# Patient Record
Sex: FEMALE | Race: WHITE | NOT HISPANIC OR LATINO | Employment: OTHER | RURAL
[De-identification: names, ages, dates, MRNs, and addresses within clinical notes are randomized per-mention and may not be internally consistent; named-entity substitution may affect disease eponyms.]

---

## 2020-07-07 ENCOUNTER — HISTORICAL (OUTPATIENT)
Dept: ADMINISTRATIVE | Facility: HOSPITAL | Age: 65
End: 2020-07-07

## 2020-07-28 ENCOUNTER — HISTORICAL (OUTPATIENT)
Dept: ADMINISTRATIVE | Facility: HOSPITAL | Age: 65
End: 2020-07-28

## 2020-07-30 LAB
LAB AP COMMENTS: NORMAL
LAB AP GENERAL CAT - HISTORICAL: NORMAL
LAB AP INTERPRETATION/RESULT - HISTORICAL: NEGATIVE
LAB AP SPECIMEN ADEQUACY - HISTORICAL: NORMAL
LAB AP SPECIMEN SUBMITTED - HISTORICAL: NORMAL
PAP RECOMMENDATION EXT: NORMAL
PAP SMEAR: NORMAL

## 2021-04-08 ENCOUNTER — HISTORICAL (OUTPATIENT)
Dept: ADMINISTRATIVE | Facility: HOSPITAL | Age: 66
End: 2021-04-08

## 2021-04-19 ENCOUNTER — HISTORICAL (OUTPATIENT)
Dept: ADMINISTRATIVE | Facility: HOSPITAL | Age: 66
End: 2021-04-19

## 2021-04-21 DIAGNOSIS — R41.3 MEMORY IMPAIRMENT: Primary | ICD-10-CM

## 2021-05-18 DIAGNOSIS — E78.5 HYPERLIPIDEMIA, UNSPECIFIED HYPERLIPIDEMIA TYPE: ICD-10-CM

## 2021-05-18 DIAGNOSIS — E78.5 HYPERLIPIDEMIA, UNSPECIFIED HYPERLIPIDEMIA TYPE: Primary | ICD-10-CM

## 2021-05-18 RX ORDER — PRAVASTATIN SODIUM 40 MG/1
40 TABLET ORAL NIGHTLY
COMMUNITY
End: 2021-05-18 | Stop reason: SDUPTHER

## 2021-05-18 RX ORDER — PRAVASTATIN SODIUM 40 MG/1
40 TABLET ORAL NIGHTLY
Qty: 30 TABLET | Refills: 5 | Status: SHIPPED | OUTPATIENT
Start: 2021-05-18 | End: 2021-05-18 | Stop reason: SDUPTHER

## 2021-05-18 RX ORDER — PRAVASTATIN SODIUM 40 MG/1
40 TABLET ORAL NIGHTLY
Qty: 30 TABLET | Refills: 5 | Status: SHIPPED | OUTPATIENT
Start: 2021-05-18 | End: 2021-07-29 | Stop reason: SDUPTHER

## 2021-05-24 RX ORDER — OMEGA-3 FATTY ACIDS 1000 MG
1 CAPSULE ORAL DAILY
COMMUNITY
End: 2021-05-25

## 2021-05-24 RX ORDER — GLUCOSAMINE SULFATE DIPOT CHLR 1000 MG
1 TABLET ORAL DAILY
COMMUNITY

## 2021-05-24 RX ORDER — MELOXICAM 7.5 MG/1
7.5 TABLET ORAL DAILY
COMMUNITY
End: 2021-07-29 | Stop reason: SDUPTHER

## 2021-05-24 RX ORDER — LANOLIN ALCOHOL/MO/W.PET/CERES
100 CREAM (GRAM) TOPICAL DAILY
COMMUNITY
End: 2022-01-25 | Stop reason: ALTCHOICE

## 2021-05-24 RX ORDER — PAROXETINE 30 MG/1
30 TABLET, FILM COATED ORAL EVERY MORNING
COMMUNITY
End: 2021-07-29 | Stop reason: SDUPTHER

## 2021-05-24 RX ORDER — LISINOPRIL AND HYDROCHLOROTHIAZIDE 12.5; 2 MG/1; MG/1
1 TABLET ORAL DAILY
COMMUNITY
End: 2021-07-29 | Stop reason: SDUPTHER

## 2021-05-24 RX ORDER — OMEPRAZOLE 20 MG/1
20 CAPSULE, DELAYED RELEASE ORAL DAILY
COMMUNITY
End: 2021-07-29 | Stop reason: SDUPTHER

## 2021-05-25 ENCOUNTER — OFFICE VISIT (OUTPATIENT)
Dept: FAMILY MEDICINE | Facility: CLINIC | Age: 66
End: 2021-05-25
Payer: COMMERCIAL

## 2021-05-25 VITALS
RESPIRATION RATE: 16 BRPM | DIASTOLIC BLOOD PRESSURE: 62 MMHG | BODY MASS INDEX: 27.48 KG/M2 | TEMPERATURE: 98 F | WEIGHT: 171 LBS | HEART RATE: 70 BPM | HEIGHT: 66 IN | OXYGEN SATURATION: 93 % | SYSTOLIC BLOOD PRESSURE: 100 MMHG

## 2021-05-25 DIAGNOSIS — M19.90 ARTHRITIS: ICD-10-CM

## 2021-05-25 DIAGNOSIS — M54.31 BACK PAIN WITH RIGHT-SIDED SCIATICA: Primary | ICD-10-CM

## 2021-05-25 PROCEDURE — 99213 PR OFFICE/OUTPT VISIT, EST, LEVL III, 20-29 MIN: ICD-10-PCS | Mod: ,,, | Performed by: INTERNAL MEDICINE

## 2021-05-25 PROCEDURE — 99213 OFFICE O/P EST LOW 20 MIN: CPT | Mod: ,,, | Performed by: INTERNAL MEDICINE

## 2021-05-25 RX ORDER — METHYLPREDNISOLONE 4 MG/1
TABLET ORAL
Qty: 1 PACKAGE | Refills: 0 | Status: SHIPPED | OUTPATIENT
Start: 2021-05-25 | End: 2022-01-25 | Stop reason: ALTCHOICE

## 2021-07-29 ENCOUNTER — OFFICE VISIT (OUTPATIENT)
Dept: FAMILY MEDICINE | Facility: CLINIC | Age: 66
End: 2021-07-29
Payer: COMMERCIAL

## 2021-07-29 VITALS
DIASTOLIC BLOOD PRESSURE: 80 MMHG | WEIGHT: 173 LBS | SYSTOLIC BLOOD PRESSURE: 118 MMHG | TEMPERATURE: 99 F | BODY MASS INDEX: 27.8 KG/M2 | RESPIRATION RATE: 18 BRPM | HEIGHT: 66 IN | OXYGEN SATURATION: 97 % | HEART RATE: 68 BPM

## 2021-07-29 DIAGNOSIS — Z13.1 SCREENING FOR DIABETES MELLITUS: ICD-10-CM

## 2021-07-29 DIAGNOSIS — I10 HYPERTENSION, UNSPECIFIED TYPE: ICD-10-CM

## 2021-07-29 DIAGNOSIS — M19.90 ARTHRITIS: ICD-10-CM

## 2021-07-29 DIAGNOSIS — Z13.220 SCREENING, LIPID: ICD-10-CM

## 2021-07-29 DIAGNOSIS — E78.5 HYPERLIPIDEMIA, UNSPECIFIED HYPERLIPIDEMIA TYPE: ICD-10-CM

## 2021-07-29 DIAGNOSIS — Z00.00 VISIT FOR PREVENTIVE HEALTH EXAMINATION: Primary | ICD-10-CM

## 2021-07-29 DIAGNOSIS — K21.9 GASTROESOPHAGEAL REFLUX DISEASE, UNSPECIFIED WHETHER ESOPHAGITIS PRESENT: ICD-10-CM

## 2021-07-29 DIAGNOSIS — F32.A DEPRESSION, UNSPECIFIED DEPRESSION TYPE: ICD-10-CM

## 2021-07-29 LAB
ALBUMIN SERPL BCP-MCNC: 3.7 G/DL (ref 3.5–5)
ALBUMIN/GLOB SERPL: 1.1 {RATIO}
ALP SERPL-CCNC: 85 U/L (ref 50–130)
ALT SERPL W P-5'-P-CCNC: 13 U/L (ref 13–56)
ANION GAP SERPL CALCULATED.3IONS-SCNC: 8 MMOL/L (ref 7–16)
AST SERPL W P-5'-P-CCNC: 24 U/L (ref 15–37)
BASOPHILS # BLD AUTO: 0.06 K/UL (ref 0–0.2)
BASOPHILS NFR BLD AUTO: 0.9 % (ref 0–1)
BILIRUB SERPL-MCNC: 0.3 MG/DL (ref 0–1.2)
BUN SERPL-MCNC: 17 MG/DL (ref 7–18)
BUN/CREAT SERPL: 30 (ref 6–20)
CALCIUM SERPL-MCNC: 9.5 MG/DL (ref 8.5–10.1)
CHLORIDE SERPL-SCNC: 107 MMOL/L (ref 98–107)
CHOLEST SERPL-MCNC: 200 MG/DL (ref 0–200)
CHOLEST/HDLC SERPL: 5 {RATIO}
CK SERPL-CCNC: 97 U/L (ref 26–192)
CO2 SERPL-SCNC: 30 MMOL/L (ref 21–32)
CREAT SERPL-MCNC: 0.57 MG/DL (ref 0.55–1.02)
DIFFERENTIAL METHOD BLD: ABNORMAL
EOSINOPHIL # BLD AUTO: 0.13 K/UL (ref 0–0.5)
EOSINOPHIL NFR BLD AUTO: 1.9 % (ref 1–4)
ERYTHROCYTE [DISTWIDTH] IN BLOOD BY AUTOMATED COUNT: 12.5 % (ref 11.5–14.5)
GLOBULIN SER-MCNC: 3.5 G/DL (ref 2–4)
GLUCOSE SERPL-MCNC: 100 MG/DL (ref 74–106)
HCT VFR BLD AUTO: 40.4 % (ref 38–47)
HDLC SERPL-MCNC: 40 MG/DL (ref 40–60)
HGB BLD-MCNC: 13.3 G/DL (ref 12–16)
IMM GRANULOCYTES # BLD AUTO: 0.01 K/UL (ref 0–0.04)
IMM GRANULOCYTES NFR BLD: 0.1 % (ref 0–0.4)
LDLC SERPL CALC-MCNC: 138 MG/DL
LDLC/HDLC SERPL: 3.5 {RATIO}
LYMPHOCYTES # BLD AUTO: 2.03 K/UL (ref 1–4.8)
LYMPHOCYTES NFR BLD AUTO: 30.3 % (ref 27–41)
MCH RBC QN AUTO: 30.2 PG (ref 27–31)
MCHC RBC AUTO-ENTMCNC: 32.9 G/DL (ref 32–36)
MCV RBC AUTO: 91.8 FL (ref 80–96)
MONOCYTES # BLD AUTO: 0.62 K/UL (ref 0–0.8)
MONOCYTES NFR BLD AUTO: 9.2 % (ref 2–6)
MPC BLD CALC-MCNC: 9.7 FL (ref 9.4–12.4)
NEUTROPHILS # BLD AUTO: 3.86 K/UL (ref 1.8–7.7)
NEUTROPHILS NFR BLD AUTO: 57.6 % (ref 53–65)
NONHDLC SERPL-MCNC: 160 MG/DL
NRBC # BLD AUTO: 0 X10E3/UL
NRBC, AUTO (.00): 0 %
PLATELET # BLD AUTO: 310 K/UL (ref 150–400)
POTASSIUM SERPL-SCNC: 4 MMOL/L (ref 3.5–5.1)
PROT SERPL-MCNC: 7.2 G/DL (ref 6.4–8.2)
RBC # BLD AUTO: 4.4 M/UL (ref 4.2–5.4)
SODIUM SERPL-SCNC: 141 MMOL/L (ref 136–145)
TRIGL SERPL-MCNC: 109 MG/DL (ref 35–150)
VLDLC SERPL-MCNC: 22 MG/DL
WBC # BLD AUTO: 6.71 K/UL (ref 4.5–11)

## 2021-07-29 PROCEDURE — 80061 LIPID PANEL: CPT | Mod: ,,, | Performed by: CLINICAL MEDICAL LABORATORY

## 2021-07-29 PROCEDURE — 82550 CK: ICD-10-PCS | Mod: ,,, | Performed by: CLINICAL MEDICAL LABORATORY

## 2021-07-29 PROCEDURE — 80061 LIPID PANEL: ICD-10-PCS | Mod: ,,, | Performed by: CLINICAL MEDICAL LABORATORY

## 2021-07-29 PROCEDURE — 85025 COMPLETE CBC W/AUTO DIFF WBC: CPT | Mod: ,,, | Performed by: CLINICAL MEDICAL LABORATORY

## 2021-07-29 PROCEDURE — 99396 PR PREVENTIVE VISIT,EST,40-64: ICD-10-PCS | Mod: ,,, | Performed by: NURSE PRACTITIONER

## 2021-07-29 PROCEDURE — 80053 COMPREHENSIVE METABOLIC PANEL: ICD-10-PCS | Mod: ,,, | Performed by: CLINICAL MEDICAL LABORATORY

## 2021-07-29 PROCEDURE — 80053 COMPREHEN METABOLIC PANEL: CPT | Mod: ,,, | Performed by: CLINICAL MEDICAL LABORATORY

## 2021-07-29 PROCEDURE — 82550 ASSAY OF CK (CPK): CPT | Mod: ,,, | Performed by: CLINICAL MEDICAL LABORATORY

## 2021-07-29 PROCEDURE — 85025 CBC WITH DIFFERENTIAL: ICD-10-PCS | Mod: ,,, | Performed by: CLINICAL MEDICAL LABORATORY

## 2021-07-29 PROCEDURE — 99396 PREV VISIT EST AGE 40-64: CPT | Mod: ,,, | Performed by: NURSE PRACTITIONER

## 2021-07-29 RX ORDER — PRAVASTATIN SODIUM 40 MG/1
40 TABLET ORAL NIGHTLY
Qty: 30 TABLET | Refills: 5 | Status: SHIPPED | OUTPATIENT
Start: 2021-07-29 | End: 2022-04-28 | Stop reason: SDUPTHER

## 2021-07-29 RX ORDER — MELOXICAM 7.5 MG/1
7.5 TABLET ORAL DAILY
Qty: 90 TABLET | Refills: 1 | Status: SHIPPED | OUTPATIENT
Start: 2021-07-29 | End: 2022-04-28 | Stop reason: SDUPTHER

## 2021-07-29 RX ORDER — PAROXETINE 30 MG/1
30 TABLET, FILM COATED ORAL EVERY MORNING
Qty: 90 TABLET | Refills: 1 | Status: SHIPPED | OUTPATIENT
Start: 2021-07-29 | End: 2022-04-28 | Stop reason: SDUPTHER

## 2021-07-29 RX ORDER — OMEPRAZOLE 20 MG/1
20 CAPSULE, DELAYED RELEASE ORAL DAILY
Qty: 90 CAPSULE | Refills: 1 | Status: SHIPPED | OUTPATIENT
Start: 2021-07-29 | End: 2022-01-25 | Stop reason: ALTCHOICE

## 2021-07-29 RX ORDER — LISINOPRIL AND HYDROCHLOROTHIAZIDE 12.5; 2 MG/1; MG/1
1 TABLET ORAL DAILY
Qty: 90 TABLET | Refills: 1 | Status: SHIPPED | OUTPATIENT
Start: 2021-07-29 | End: 2022-04-28 | Stop reason: SDUPTHER

## 2021-10-08 ENCOUNTER — CLINICAL SUPPORT (OUTPATIENT)
Dept: FAMILY MEDICINE | Facility: CLINIC | Age: 66
End: 2021-10-08
Payer: COMMERCIAL

## 2021-10-08 DIAGNOSIS — Z23 ENCOUNTER FOR IMMUNIZATION: Primary | ICD-10-CM

## 2021-10-08 PROCEDURE — 90686 FLU VACCINE (QUAD) GREATER THAN OR EQUAL TO 3YO PRESERVATIVE FREE IM: ICD-10-PCS | Mod: ,,, | Performed by: NURSE PRACTITIONER

## 2021-10-08 PROCEDURE — 90471 IMMUNIZATION ADMIN: CPT | Mod: ,,, | Performed by: NURSE PRACTITIONER

## 2021-10-08 PROCEDURE — 90471 FLU VACCINE (QUAD) GREATER THAN OR EQUAL TO 3YO PRESERVATIVE FREE IM: ICD-10-PCS | Mod: ,,, | Performed by: NURSE PRACTITIONER

## 2021-10-08 PROCEDURE — 90686 IIV4 VACC NO PRSV 0.5 ML IM: CPT | Mod: ,,, | Performed by: NURSE PRACTITIONER

## 2021-10-22 ENCOUNTER — OFFICE VISIT (OUTPATIENT)
Dept: FAMILY MEDICINE | Facility: CLINIC | Age: 66
End: 2021-10-22
Payer: COMMERCIAL

## 2021-10-22 VITALS
HEIGHT: 66 IN | WEIGHT: 184 LBS | BODY MASS INDEX: 29.57 KG/M2 | OXYGEN SATURATION: 96 % | TEMPERATURE: 98 F | SYSTOLIC BLOOD PRESSURE: 120 MMHG | HEART RATE: 74 BPM | DIASTOLIC BLOOD PRESSURE: 80 MMHG | RESPIRATION RATE: 16 BRPM
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
## 2021-10-22 DIAGNOSIS — M79.601 PAIN OF RIGHT UPPER EXTREMITY: ICD-10-CM

## 2021-10-22 PROCEDURE — 99213 OFFICE O/P EST LOW 20 MIN: CPT | Mod: ,,, | Performed by: FAMILY MEDICINE

## 2021-10-22 PROCEDURE — 99213 PR OFFICE/OUTPT VISIT, EST, LEVL III, 20-29 MIN: ICD-10-PCS | Mod: ,,, | Performed by: FAMILY MEDICINE

## 2021-10-22 RX ORDER — ACETAMINOPHEN 500 MG
1000 TABLET ORAL EVERY 6 HOURS PRN
Qty: 60 TABLET | Refills: 1 | Status: SHIPPED | OUTPATIENT
Start: 2021-10-22 | End: 2021-11-01

## 2021-11-01 PROBLEM — Z13.1 SCREENING FOR DIABETES MELLITUS: Status: RESOLVED | Noted: 2021-07-29 | Resolved: 2021-11-01

## 2021-11-01 PROBLEM — Z13.220 SCREENING, LIPID: Status: RESOLVED | Noted: 2021-07-29 | Resolved: 2021-11-01

## 2021-11-01 PROBLEM — Z00.00 VISIT FOR PREVENTIVE HEALTH EXAMINATION: Status: RESOLVED | Noted: 2021-07-29 | Resolved: 2021-11-01

## 2021-12-28 ENCOUNTER — IMMUNIZATION (OUTPATIENT)
Dept: FAMILY MEDICINE | Facility: CLINIC | Age: 66
End: 2021-12-28
Payer: COMMERCIAL

## 2021-12-28 DIAGNOSIS — Z23 NEED FOR VACCINATION: Primary | ICD-10-CM

## 2021-12-28 PROCEDURE — 0064A COVID-19, MRNA, LNP-S, PF, 100 MCG/0.25 ML DOSE VACCINE (MODERNA BOOSTER): CPT | Mod: ,,, | Performed by: NURSE PRACTITIONER

## 2021-12-28 PROCEDURE — 91306 COVID-19, MRNA, LNP-S, PF, 100 MCG/0.25 ML DOSE VACCINE (MODERNA BOOSTER): ICD-10-PCS | Mod: ,,, | Performed by: NURSE PRACTITIONER

## 2021-12-28 PROCEDURE — 91306 COVID-19, MRNA, LNP-S, PF, 100 MCG/0.25 ML DOSE VACCINE (MODERNA BOOSTER): CPT | Mod: ,,, | Performed by: NURSE PRACTITIONER

## 2021-12-28 PROCEDURE — 0064A COVID-19, MRNA, LNP-S, PF, 100 MCG/0.25 ML DOSE VACCINE (MODERNA BOOSTER): ICD-10-PCS | Mod: ,,, | Performed by: NURSE PRACTITIONER

## 2022-01-25 ENCOUNTER — OFFICE VISIT (OUTPATIENT)
Dept: FAMILY MEDICINE | Facility: CLINIC | Age: 67
End: 2022-01-25
Payer: COMMERCIAL

## 2022-01-25 VITALS
WEIGHT: 181 LBS | OXYGEN SATURATION: 98 % | RESPIRATION RATE: 18 BRPM | TEMPERATURE: 98 F | DIASTOLIC BLOOD PRESSURE: 80 MMHG | BODY MASS INDEX: 29.09 KG/M2 | HEART RATE: 74 BPM | SYSTOLIC BLOOD PRESSURE: 130 MMHG | HEIGHT: 66 IN

## 2022-01-25 DIAGNOSIS — M17.11 OSTEOARTHRITIS OF RIGHT KNEE, UNSPECIFIED OSTEOARTHRITIS TYPE: Primary | ICD-10-CM

## 2022-01-25 PROCEDURE — 3008F PR BODY MASS INDEX (BMI) DOCUMENTED: ICD-10-PCS | Mod: CPTII,,, | Performed by: NURSE PRACTITIONER

## 2022-01-25 PROCEDURE — 1159F PR MEDICATION LIST DOCUMENTED IN MEDICAL RECORD: ICD-10-PCS | Mod: CPTII,,, | Performed by: NURSE PRACTITIONER

## 2022-01-25 PROCEDURE — 3075F SYST BP GE 130 - 139MM HG: CPT | Mod: CPTII,,, | Performed by: NURSE PRACTITIONER

## 2022-01-25 PROCEDURE — 3079F PR MOST RECENT DIASTOLIC BLOOD PRESSURE 80-89 MM HG: ICD-10-PCS | Mod: CPTII,,, | Performed by: NURSE PRACTITIONER

## 2022-01-25 PROCEDURE — 1160F PR REVIEW ALL MEDS BY PRESCRIBER/CLIN PHARMACIST DOCUMENTED: ICD-10-PCS | Mod: CPTII,,, | Performed by: NURSE PRACTITIONER

## 2022-01-25 PROCEDURE — 99212 OFFICE O/P EST SF 10 MIN: CPT | Mod: ,,, | Performed by: NURSE PRACTITIONER

## 2022-01-25 PROCEDURE — 3008F BODY MASS INDEX DOCD: CPT | Mod: CPTII,,, | Performed by: NURSE PRACTITIONER

## 2022-01-25 PROCEDURE — 3075F PR MOST RECENT SYSTOLIC BLOOD PRESS GE 130-139MM HG: ICD-10-PCS | Mod: CPTII,,, | Performed by: NURSE PRACTITIONER

## 2022-01-25 PROCEDURE — 1159F MED LIST DOCD IN RCRD: CPT | Mod: CPTII,,, | Performed by: NURSE PRACTITIONER

## 2022-01-25 PROCEDURE — 1160F RVW MEDS BY RX/DR IN RCRD: CPT | Mod: CPTII,,, | Performed by: NURSE PRACTITIONER

## 2022-01-25 PROCEDURE — 3079F DIAST BP 80-89 MM HG: CPT | Mod: CPTII,,, | Performed by: NURSE PRACTITIONER

## 2022-01-25 PROCEDURE — 99212 PR OFFICE/OUTPT VISIT, EST, LEVL II, 10-19 MIN: ICD-10-PCS | Mod: ,,, | Performed by: NURSE PRACTITIONER

## 2022-01-25 NOTE — PATIENT INSTRUCTIONS
Discussed ice to reduce swelling consider ortho for injection especially when having increase in pain impacting mobility also trial of pennsaid

## 2022-01-25 NOTE — PROGRESS NOTES
2022     LIDIA Banks   [facility]  [address]  [place] [address]     PATIENT NAME: Lucita Bonilla  : 1955  DATE: 22  MRN: [id number]      Billing Provider: LIDIA Banks  Level of Service:   Patient PCP Information     Provider PCP Type    Zaki Damico DO General          Reason for Visit / Chief Complaint: Knee Pain (C/o right knee pain, swelling for a couple of days, difficulty walking at times)       Update PCP  Update Chief Complaint         History of Present Illness / Problem Focused Workflow     Lucita Bonilla presents to the clinic increasing right knee pain, she has slipped on it recently, notices swelling in medial knee not giving away but will swell with increased use and changes in weather      Review of Systems     Review of Systems   Constitutional: Negative for activity change, appetite change, fatigue and fever.   HENT: Negative for nasal congestion, ear pain and trouble swallowing.    Eyes: Negative for visual disturbance.   Respiratory: Negative for cough, chest tightness, shortness of breath and wheezing.    Cardiovascular: Negative for chest pain, palpitations, leg swelling and claudication.   Gastrointestinal: Negative for abdominal pain.   Genitourinary: Negative for difficulty urinating.   Musculoskeletal: Positive for arthralgias and joint swelling.   Integumentary:  Negative for pallor, rash and wound.   Neurological: Negative for weakness and headaches.   Hematological: Negative for adenopathy.        Medical / Social / Family History     Past Medical History:   Diagnosis Date    Adult BMI 25.0-25.9 kg/sq m     Anxiety     Arthritis     Calculus of gallbladder and bile duct w/o cholecystitis or obstruction     Carpal tunnel syndrome     Depression     GERD (gastroesophageal reflux disease)     History of traumatic brain injury     Hyperlipidemia     Hypertension     Memory impairment      "Minimal cognitive impairment     Rosacea     Scoliosis of lumbar spine     Vitamin D deficiency     Xerosis cutis        Past Surgical History:   Procedure Laterality Date    BRAIN SURGERY      COLONOSCOPY  01/20/2012    HYSTERECTOMY      TONSILLECTOMY         Social History  Ms.  reports that she has quit smoking. She has never used smokeless tobacco. She reports that she does not drink alcohol.    Family History  Ms.'s family history includes Heart disease in her father; Hypertension in her brother, father, and mother; Thyroid disease in her mother.    Medications and Allergies     Medications  Outpatient Medications Marked as Taking for the 1/25/22 encounter (Office Visit) with LIDIA Banks   Medication Sig Dispense Refill    calcium carb-D3-mag ox-zinc ox (ALEX MAG ZINC PLUS D3) 333 mg-133 unit -133 mg-5 mg Tab Take 1 tablet by mouth once daily.      lisinopriL-hydrochlorothiazide (PRINZIDE,ZESTORETIC) 20-12.5 mg per tablet Take 1 tablet by mouth once daily. 90 tablet 1    meloxicam (MOBIC) 7.5 MG tablet Take 1 tablet (7.5 mg total) by mouth once daily. 90 tablet 1    paroxetine (PAXIL) 30 MG tablet Take 1 tablet (30 mg total) by mouth every morning. 90 tablet 1    pravastatin (PRAVACHOL) 40 MG tablet Take 1 tablet (40 mg total) by mouth every evening. 30 tablet 5       Allergies  Review of patient's allergies indicates:   Allergen Reactions    Amoxicillin Itching       Physical Examination     Vitals:    01/25/22 0806   BP: 130/80   BP Location: Left arm   Patient Position: Sitting   Pulse: 74   Resp: 18   Temp: 98.3 °F (36.8 °C)   TempSrc: Oral   SpO2: 98%   Weight: 82.1 kg (181 lb)   Height: 5' 5.5" (1.664 m)      Physical Exam  Vitals and nursing note reviewed.   Constitutional:       Appearance: Normal appearance.   HENT:      Right Ear: External ear normal.      Left Ear: External ear normal.   Eyes:      Pupils: Pupils are equal, round, and reactive to light. "   Cardiovascular:      Rate and Rhythm: Regular rhythm.      Heart sounds: Normal heart sounds.   Pulmonary:      Breath sounds: Normal breath sounds.   Musculoskeletal:      Cervical back: Normal range of motion.      Right knee: Swelling, effusion and crepitus present. No MCL laxity.   Skin:     General: Skin is warm and dry.      Capillary Refill: Capillary refill takes less than 2 seconds.   Neurological:      Mental Status: She is alert.          Assessment and Plan (including Health Maintenance)      Problem List  Smart Sets  Document Outside HM   :    Plan:   Discussed ice to reduce swelling consider ortho for injection especially when having increase in pain impacting mobility also trial of pennsaid      Health Maintenance Due   Topic Date Due    Hepatitis C Screening  Never done    HIV Screening  Never done    Colorectal Cancer Screening  01/20/2022       Problem List Items Addressed This Visit    None     Visit Diagnoses     Osteoarthritis of right knee, unspecified osteoarthritis type    -  Primary        Osteoarthritis of right knee, unspecified osteoarthritis type       Health Maintenance Topics with due status: Not Due       Topic Last Completion Date    TETANUS VACCINE 11/09/2019    Mammogram 04/19/2021    Lipid Panel 07/29/2021       Procedures     Future Appointments   Date Time Provider Department Center   1/31/2022  9:00 AM Starr Dietz NP [facility]   8/2/2022  9:00 AM Starr Dietz NP [facility]        Follow up if symptoms worsen or fail to improve.     Signature:  LIDIA Banks    Date of encounter: 1/25/22

## 2022-02-21 ENCOUNTER — OFFICE VISIT (OUTPATIENT)
Dept: FAMILY MEDICINE | Facility: CLINIC | Age: 67
End: 2022-02-21
Payer: COMMERCIAL

## 2022-02-21 VITALS
HEART RATE: 85 BPM | OXYGEN SATURATION: 99 % | TEMPERATURE: 98 F | DIASTOLIC BLOOD PRESSURE: 86 MMHG | BODY MASS INDEX: 29.03 KG/M2 | RESPIRATION RATE: 20 BRPM | SYSTOLIC BLOOD PRESSURE: 110 MMHG | HEIGHT: 66 IN | WEIGHT: 180.63 LBS

## 2022-02-21 DIAGNOSIS — M54.50 LUMBAR BACK PAIN: Primary | ICD-10-CM

## 2022-02-21 PROCEDURE — 3079F PR MOST RECENT DIASTOLIC BLOOD PRESSURE 80-89 MM HG: ICD-10-PCS | Mod: CPTII,,, | Performed by: FAMILY MEDICINE

## 2022-02-21 PROCEDURE — 1159F PR MEDICATION LIST DOCUMENTED IN MEDICAL RECORD: ICD-10-PCS | Mod: CPTII,,, | Performed by: FAMILY MEDICINE

## 2022-02-21 PROCEDURE — 3074F PR MOST RECENT SYSTOLIC BLOOD PRESSURE < 130 MM HG: ICD-10-PCS | Mod: CPTII,,, | Performed by: FAMILY MEDICINE

## 2022-02-21 PROCEDURE — 3074F SYST BP LT 130 MM HG: CPT | Mod: CPTII,,, | Performed by: FAMILY MEDICINE

## 2022-02-21 PROCEDURE — 99212 OFFICE O/P EST SF 10 MIN: CPT | Mod: ,,, | Performed by: FAMILY MEDICINE

## 2022-02-21 PROCEDURE — 3079F DIAST BP 80-89 MM HG: CPT | Mod: CPTII,,, | Performed by: FAMILY MEDICINE

## 2022-02-21 PROCEDURE — 99212 PR OFFICE/OUTPT VISIT, EST, LEVL II, 10-19 MIN: ICD-10-PCS | Mod: ,,, | Performed by: FAMILY MEDICINE

## 2022-02-21 PROCEDURE — 3008F PR BODY MASS INDEX (BMI) DOCUMENTED: ICD-10-PCS | Mod: CPTII,,, | Performed by: FAMILY MEDICINE

## 2022-02-21 PROCEDURE — 1159F MED LIST DOCD IN RCRD: CPT | Mod: CPTII,,, | Performed by: FAMILY MEDICINE

## 2022-02-21 PROCEDURE — 3008F BODY MASS INDEX DOCD: CPT | Mod: CPTII,,, | Performed by: FAMILY MEDICINE

## 2022-02-21 RX ORDER — ACETAMINOPHEN 500 MG
1000 TABLET ORAL EVERY 8 HOURS PRN
Qty: 60 TABLET | Refills: 0 | Status: SHIPPED | OUTPATIENT
Start: 2022-02-21 | End: 2022-06-17

## 2022-02-21 NOTE — PROGRESS NOTES
Zaki Damico DO   [facility]  [address]  [place]     PATIENT NAME: Lucita Bonilla  : 1955  DATE: 22  MRN: [id number]      Billing Provider: Zaki Damico DO  Level of Service:   Patient PCP Information     Provider PCP Type    Zaki Damico DO General          Reason for Visit / Chief Complaint: Back Pain (Reports she fell 4.5 weeks ago and having lower back pain. )       Update PCP  Update Chief Complaint         History of Present Illness / Problem Focused Workflow     Lucita Bonilla presents to the clinic for back pain that began 4 weeks ago. Pt reports was walking dog and going up an embankment and slipped and fell forward twice. Has experience low back pain since. No other complaints. No otc medication.       Review of Systems     Review of Systems   Constitutional: Negative.    Eyes: Negative.    Respiratory: Negative.    Cardiovascular: Negative.    Gastrointestinal: Negative.    All other systems reviewed and are negative.       Medical / Social / Family History     Past Medical History:   Diagnosis Date    Adult BMI 25.0-25.9 kg/sq m     Anxiety     Arthritis     Calculus of gallbladder and bile duct w/o cholecystitis or obstruction     Carpal tunnel syndrome     Depression     GERD (gastroesophageal reflux disease)     History of traumatic brain injury     Hyperlipidemia     Hypertension     Memory impairment     Minimal cognitive impairment     Rosacea     Scoliosis of lumbar spine     Vitamin D deficiency     Xerosis cutis        Past Surgical History:   Procedure Laterality Date    BRAIN SURGERY      COLONOSCOPY  2012    HYSTERECTOMY      TONSILLECTOMY         Social History  Ms.  reports that she has quit smoking. She has never used smokeless tobacco. She reports that she does not drink alcohol and does not use drugs.    Family History  Ms.'s family history includes Heart disease in her father; Hypertension in her  "brother, father, and mother; Thyroid disease in her mother.    Medications and Allergies     Medications  Outpatient Medications Marked as Taking for the 2/21/22 encounter (Office Visit) with Zaki Damico, DO   Medication Sig Dispense Refill    calcium carb-D3-mag ox-zinc ox (ALEX MAG ZINC PLUS D3) 333 mg-133 unit -133 mg-5 mg Tab Take 1 tablet by mouth once daily.      lisinopriL-hydrochlorothiazide (PRINZIDE,ZESTORETIC) 20-12.5 mg per tablet Take 1 tablet by mouth once daily. 90 tablet 1    meloxicam (MOBIC) 7.5 MG tablet Take 1 tablet (7.5 mg total) by mouth once daily. 90 tablet 1    paroxetine (PAXIL) 30 MG tablet Take 1 tablet (30 mg total) by mouth every morning. 90 tablet 1    pravastatin (PRAVACHOL) 40 MG tablet Take 1 tablet (40 mg total) by mouth every evening. 30 tablet 5       Allergies  Review of patient's allergies indicates:   Allergen Reactions    Amoxicillin Itching       Physical Examination     Vitals:    02/21/22 1639   BP: 110/86   BP Location: Left arm   Patient Position: Sitting   BP Method: Large (Manual)   Pulse: 85   Resp: 20   Temp: 98.4 °F (36.9 °C)   TempSrc: Oral   SpO2: 99%   Weight: 81.9 kg (180 lb 9.6 oz)   Height: 5' 5.5" (1.664 m)      Physical Exam  Vitals and nursing note reviewed.   Cardiovascular:      Pulses: Normal pulses.      Heart sounds: Normal heart sounds.   Pulmonary:      Effort: Pulmonary effort is normal.      Breath sounds: Normal breath sounds.   Musculoskeletal:         General: Tenderness and signs of injury present. No swelling or deformity. Normal range of motion.      Comments: Tenderness over L 5   Neurological:      General: No focal deficit present.      Mental Status: She is oriented to person, place, and time. Mental status is at baseline.      Sensory: No sensory deficit.      Motor: No weakness.      Gait: Gait normal.   Psychiatric:         Mood and Affect: Mood normal.         Behavior: Behavior normal.         Thought Content: Thought " content normal.         Judgment: Judgment normal.          Assessment and Plan (including Health Maintenance)      Problem List  Smart Sets  Document Outside HM   :    Plan:   X ray L spine        Health Maintenance Due   Topic Date Due    Hepatitis C Screening  Never done    HIV Screening  Never done    Colorectal Cancer Screening  01/20/2022    Mammogram  04/19/2022       Problem List Items Addressed This Visit    None     Visit Diagnoses     Lumbar back pain    -  Primary    Relevant Orders    X-Ray Lumbar Spine AP And Lateral        Lumbar back pain  -     X-Ray Lumbar Spine AP And Lateral; Future; Expected date: 02/21/2022       Health Maintenance Topics with due status: Not Due       Topic Last Completion Date    TETANUS VACCINE 11/09/2019    Lipid Panel 07/29/2021       Procedures     Future Appointments   Date Time Provider Department Center   8/2/2022  9:00 AM Starr Dietz NP [facility]        No follow-ups on file.       Signature:  Zaki Damico DO    Date of encounter: 2/21/22    Education Documentation  No documentation found.  Education Comments  No comments found.       There are no Patient Instructions on file for this visit.

## 2022-02-22 DIAGNOSIS — M54.9 DORSALGIA, UNSPECIFIED: ICD-10-CM

## 2022-03-01 ENCOUNTER — OFFICE VISIT (OUTPATIENT)
Dept: FAMILY MEDICINE | Facility: CLINIC | Age: 67
End: 2022-03-01
Payer: COMMERCIAL

## 2022-03-01 VITALS
DIASTOLIC BLOOD PRESSURE: 70 MMHG | SYSTOLIC BLOOD PRESSURE: 130 MMHG | WEIGHT: 181 LBS | RESPIRATION RATE: 18 BRPM | HEIGHT: 66 IN | HEART RATE: 77 BPM | TEMPERATURE: 99 F | OXYGEN SATURATION: 99 % | BODY MASS INDEX: 29.09 KG/M2

## 2022-03-01 DIAGNOSIS — M25.561 ACUTE PAIN OF RIGHT KNEE: Primary | ICD-10-CM
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
## 2022-03-01 DIAGNOSIS — M54.50 LUMBAR BACK PAIN: ICD-10-CM

## 2022-03-01 PROCEDURE — 3078F PR MOST RECENT DIASTOLIC BLOOD PRESSURE < 80 MM HG: ICD-10-PCS | Mod: CPTII,,, | Performed by: NURSE PRACTITIONER

## 2022-03-01 PROCEDURE — 3075F SYST BP GE 130 - 139MM HG: CPT | Mod: CPTII,,, | Performed by: NURSE PRACTITIONER

## 2022-03-01 PROCEDURE — 3008F BODY MASS INDEX DOCD: CPT | Mod: CPTII,,, | Performed by: NURSE PRACTITIONER

## 2022-03-01 PROCEDURE — 3078F DIAST BP <80 MM HG: CPT | Mod: CPTII,,, | Performed by: NURSE PRACTITIONER

## 2022-03-01 PROCEDURE — 3075F PR MOST RECENT SYSTOLIC BLOOD PRESS GE 130-139MM HG: ICD-10-PCS | Mod: CPTII,,, | Performed by: NURSE PRACTITIONER

## 2022-03-01 PROCEDURE — 99214 OFFICE O/P EST MOD 30 MIN: CPT | Mod: ,,, | Performed by: NURSE PRACTITIONER

## 2022-03-01 PROCEDURE — 1159F PR MEDICATION LIST DOCUMENTED IN MEDICAL RECORD: ICD-10-PCS | Mod: CPTII,,, | Performed by: NURSE PRACTITIONER

## 2022-03-01 PROCEDURE — 99214 PR OFFICE/OUTPT VISIT, EST, LEVL IV, 30-39 MIN: ICD-10-PCS | Mod: ,,, | Performed by: NURSE PRACTITIONER

## 2022-03-01 PROCEDURE — 3008F PR BODY MASS INDEX (BMI) DOCUMENTED: ICD-10-PCS | Mod: CPTII,,, | Performed by: NURSE PRACTITIONER

## 2022-03-01 PROCEDURE — 1159F MED LIST DOCD IN RCRD: CPT | Mod: CPTII,,, | Performed by: NURSE PRACTITIONER

## 2022-03-01 RX ORDER — DICLOFENAC SODIUM 75 MG/1
75 TABLET, DELAYED RELEASE ORAL 2 TIMES DAILY
Qty: 20 TABLET | Refills: 0 | Status: SHIPPED | OUTPATIENT
Start: 2022-03-01 | End: 2022-04-28 | Stop reason: ALTCHOICE

## 2022-03-01 RX ORDER — DICLOFENAC SODIUM 20 MG/G
1 SOLUTION TOPICAL EVERY 6 HOURS PRN
Qty: 112 G | Refills: 1 | Status: SHIPPED | OUTPATIENT
Start: 2022-03-01 | End: 2022-06-17

## 2022-03-01 NOTE — PROGRESS NOTES
Starr Dietz NP   [facility]  [address]  [place]     PATIENT NAME: Lucita Bonilla  : 1955  DATE: 3/1/22  MRN: [id number]      Billing Provider: Starr Dietz NP  Level of Service:   Patient PCP Information     Provider PCP Type    Zaki Damico DO General          Reason for Visit / Chief Complaint: Fall (Fell a month ago on wet grass. ), Knee Pain (Right knee is giving her trouble from her fall. Pt states that her knee is better when she is up and going. ), and Back Pain (Back pain from fall)       Update PCP  Update Chief Complaint         History of Present Illness / Problem Focused Workflow     Lucita Bonilla presents to the clinic   Fell a month ago on wet grass, right knee pain, stated that her knee is bettter when she is up and going, also back pain from fall.pain is worse in the am    Review of Systems     Review of Systems   Constitutional: Negative for chills, fatigue and fever.   HENT: Negative for nasal congestion, ear pain, facial swelling, hearing loss, mouth dryness, mouth sores, postnasal drip, rhinorrhea, sinus pressure/congestion and goiter.    Eyes: Negative for discharge and itching.   Respiratory: Negative for cough, shortness of breath and wheezing.    Cardiovascular: Negative for chest pain and leg swelling.   Gastrointestinal: Negative for abdominal pain, change in bowel habit and change in bowel habit.   Genitourinary: Negative for difficulty urinating, dysuria, enuresis, frequency, hematuria and urgency.   Musculoskeletal: Positive for back pain.        Right knee pain   Neurological: Negative for dizziness, vertigo, syncope, weakness and headaches.   Psychiatric/Behavioral: Negative for decreased concentration.        Medical / Social / Family History     Past Medical History:   Diagnosis Date    Adult BMI 25.0-25.9 kg/sq m     Anxiety     Arthritis     Calculus of gallbladder and bile duct w/o cholecystitis or obstruction     Carpal tunnel  syndrome     Depression     GERD (gastroesophageal reflux disease)     History of traumatic brain injury     Hyperlipidemia     Hypertension     Memory impairment     Minimal cognitive impairment     Rosacea     Scoliosis of lumbar spine     Vitamin D deficiency     Xerosis cutis        Past Surgical History:   Procedure Laterality Date    BRAIN SURGERY      COLONOSCOPY  01/20/2012    HYSTERECTOMY      TONSILLECTOMY         Social History  Ms.  reports that she has quit smoking. She has never used smokeless tobacco. She reports that she does not drink alcohol and does not use drugs.    Family History  Ms.'s family history includes Heart disease in her father; Hypertension in her brother, father, and mother; Thyroid disease in her mother.    Medications and Allergies     Medications  Outpatient Medications Marked as Taking for the 3/1/22 encounter (Office Visit) with Starr Dietz NP   Medication Sig Dispense Refill    acetaminophen (TYLENOL) 500 MG tablet Take 2 tablets (1,000 mg total) by mouth every 8 (eight) hours as needed for Pain. 60 tablet 0    calcium carb-D3-mag ox-zinc ox (ALEX MAG ZINC PLUS D3) 333 mg-133 unit -133 mg-5 mg Tab Take 1 tablet by mouth once daily.      lisinopriL-hydrochlorothiazide (PRINZIDE,ZESTORETIC) 20-12.5 mg per tablet Take 1 tablet by mouth once daily. 90 tablet 1    meloxicam (MOBIC) 7.5 MG tablet Take 1 tablet (7.5 mg total) by mouth once daily. 90 tablet 1    paroxetine (PAXIL) 30 MG tablet Take 1 tablet (30 mg total) by mouth every morning. 90 tablet 1    pravastatin (PRAVACHOL) 40 MG tablet Take 1 tablet (40 mg total) by mouth every evening. 30 tablet 5       Allergies  Review of patient's allergies indicates:   Allergen Reactions    Amoxicillin Itching       Physical Examination     Vitals:    03/01/22 0943   BP: 130/70   BP Location: Left arm   Patient Position: Sitting   BP Method: Medium (Manual)   Pulse: 77   Resp: 18   Temp: 98.6 °F (37 °C)  "  TempSrc: Oral   SpO2: 99%   Weight: 82.1 kg (181 lb)   Height: 5' 5.5" (1.664 m)      Physical Exam  Constitutional:       Appearance: Normal appearance.   HENT:      Head: Normocephalic.      Right Ear: Tympanic membrane, ear canal and external ear normal.      Left Ear: Tympanic membrane, ear canal and external ear normal.      Nose: Nose normal.      Mouth/Throat:      Mouth: Mucous membranes are moist.      Pharynx: Oropharynx is clear.   Eyes:      Extraocular Movements: Extraocular movements intact.      Conjunctiva/sclera: Conjunctivae normal.      Pupils: Pupils are equal, round, and reactive to light.   Cardiovascular:      Rate and Rhythm: Normal rate and regular rhythm.      Pulses: Normal pulses.      Heart sounds: Normal heart sounds.   Pulmonary:      Effort: Pulmonary effort is normal.      Breath sounds: Normal breath sounds.   Abdominal:      General: Bowel sounds are normal.      Palpations: Abdomen is soft.   Musculoskeletal:         General: Tenderness (right knee and lower back, full rom, no laxity of knee, leg lifts negative) present. Normal range of motion.   Skin:     General: Skin is warm and dry.      Capillary Refill: Capillary refill takes less than 2 seconds.   Neurological:      General: No focal deficit present.      Mental Status: She is alert and oriented to person, place, and time.   Psychiatric:         Mood and Affect: Mood normal.         Behavior: Behavior normal.          Assessment and Plan (including Health Maintenance)      Problem List  Smart Sets  Document Outside HM   :    Plan: meds as ordered, mri is scheduled in the am at [facility] @900, keep mars with dr greenberg in a couple of days as scheduled    Acute pain of right knee  -     X-Ray Knee AP LAT with [place] Right; Future; Expected date: 03/01/2022    Fall, initial encounter  -     X-Ray Knee AP LAT with [place] [facility]; Future; Expected date: 03/01/2022    Lumbar back pain    Other orders  -     diclofenac (VOLTAREN) 75 " MG EC tablet; Take 1 tablet (75 mg total) by mouth 2 (two) times daily.  Dispense: 20 tablet; Refill: 0  -     diclofenac sodium (PENNSAID) 20 mg/gram /actuation(2 %) sopm; Apply 1 application topically every 6 (six) hours as needed (knee pain).  Dispense: 112 g; Refill: 1            Health Maintenance Due   Topic Date Due    Hepatitis C Screening  Never done    HIV Screening  Never done    Colorectal Cancer Screening  01/20/2022    Mammogram  04/19/2022       Problem List Items Addressed This Visit        Orthopedic    Fall    Relevant Orders    X-Ray Knee AP LAT with [facility]    Lumbar back pain    Acute pain of right knee - Primary    Relevant Orders    X-Ray Knee AP LAT with [facility]            Health Maintenance Topics with due status: Not Due       Topic Last Completion Date    TETANUS VACCINE 11/09/2019    Lipid Panel 07/29/2021       Procedures     Future Appointments   Date Time Provider Department Center   3/2/2022  9:00 AM [facility] [place], [place] [address]    3/3/2022  8:15 AM Zaki Damico DO [facility] [place]   8/2/2022  9:00 [facility] [place]        Follow up in about 2 days (around 3/3/2022) for dr damico.       Signature:  Starr Dietz NP    Date of encounter: 3/1/22

## 2022-03-02 ENCOUNTER — OFFICE VISIT (OUTPATIENT)
Dept: FAMILY MEDICINE | Facility: CLINIC | Age: 67
End: 2022-03-02
Payer: COMMERCIAL

## 2022-03-02 VITALS
HEIGHT: 66 IN | DIASTOLIC BLOOD PRESSURE: 84 MMHG | WEIGHT: 181.63 LBS | HEART RATE: 64 BPM | OXYGEN SATURATION: 98 % | BODY MASS INDEX: 29.19 KG/M2 | RESPIRATION RATE: 16 BRPM | SYSTOLIC BLOOD PRESSURE: 122 MMHG | TEMPERATURE: 98 F

## 2022-03-02 DIAGNOSIS — R41.3 MEMORY IMPAIRMENT: Primary | ICD-10-CM

## 2022-03-02 PROCEDURE — 3074F SYST BP LT 130 MM HG: CPT | Mod: CPTII,,, | Performed by: FAMILY MEDICINE

## 2022-03-02 PROCEDURE — 3074F PR MOST RECENT SYSTOLIC BLOOD PRESSURE < 130 MM HG: ICD-10-PCS | Mod: CPTII,,, | Performed by: FAMILY MEDICINE

## 2022-03-02 PROCEDURE — 99212 PR OFFICE/OUTPT VISIT, EST, LEVL II, 10-19 MIN: ICD-10-PCS | Mod: ,,, | Performed by: FAMILY MEDICINE

## 2022-03-02 PROCEDURE — 3008F PR BODY MASS INDEX (BMI) DOCUMENTED: ICD-10-PCS | Mod: CPTII,,, | Performed by: FAMILY MEDICINE

## 2022-03-02 PROCEDURE — 3008F BODY MASS INDEX DOCD: CPT | Mod: CPTII,,, | Performed by: FAMILY MEDICINE

## 2022-03-02 PROCEDURE — 1159F MED LIST DOCD IN RCRD: CPT | Mod: CPTII,,, | Performed by: FAMILY MEDICINE

## 2022-03-02 PROCEDURE — 3079F PR MOST RECENT DIASTOLIC BLOOD PRESSURE 80-89 MM HG: ICD-10-PCS | Mod: CPTII,,, | Performed by: FAMILY MEDICINE

## 2022-03-02 PROCEDURE — 3079F DIAST BP 80-89 MM HG: CPT | Mod: CPTII,,, | Performed by: FAMILY MEDICINE

## 2022-03-02 PROCEDURE — 1159F PR MEDICATION LIST DOCUMENTED IN MEDICAL RECORD: ICD-10-PCS | Mod: CPTII,,, | Performed by: FAMILY MEDICINE

## 2022-03-02 PROCEDURE — 99212 OFFICE O/P EST SF 10 MIN: CPT | Mod: ,,, | Performed by: FAMILY MEDICINE
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
## 2022-03-29 ENCOUNTER — OFFICE VISIT (OUTPATIENT)
Dept: FAMILY MEDICINE | Facility: CLINIC | Age: 67
End: 2022-03-29
Payer: COMMERCIAL

## 2022-03-29 VITALS
HEART RATE: 109 BPM | WEIGHT: 180.63 LBS | SYSTOLIC BLOOD PRESSURE: 169 MMHG | OXYGEN SATURATION: 96 % | TEMPERATURE: 98 F | DIASTOLIC BLOOD PRESSURE: 94 MMHG | RESPIRATION RATE: 16 BRPM | HEIGHT: 67 IN | BODY MASS INDEX: 28.35 KG/M2

## 2022-03-29 DIAGNOSIS — R41.3 MEMORY IMPAIRMENT: Primary | ICD-10-CM

## 2022-03-29 PROCEDURE — 1159F MED LIST DOCD IN RCRD: CPT | Mod: CPTII,,, | Performed by: FAMILY MEDICINE

## 2022-03-29 PROCEDURE — 3080F DIAST BP >= 90 MM HG: CPT | Mod: CPTII,,, | Performed by: FAMILY MEDICINE

## 2022-03-29 PROCEDURE — 3077F PR MOST RECENT SYSTOLIC BLOOD PRESSURE >= 140 MM HG: ICD-10-PCS | Mod: CPTII,,, | Performed by: FAMILY MEDICINE

## 2022-03-29 PROCEDURE — 99213 OFFICE O/P EST LOW 20 MIN: CPT | Mod: ,,, | Performed by: FAMILY MEDICINE

## 2022-03-29 PROCEDURE — 1159F PR MEDICATION LIST DOCUMENTED IN MEDICAL RECORD: ICD-10-PCS | Mod: CPTII,,, | Performed by: FAMILY MEDICINE

## 2022-03-29 PROCEDURE — 3080F PR MOST RECENT DIASTOLIC BLOOD PRESSURE >= 90 MM HG: ICD-10-PCS | Mod: CPTII,,, | Performed by: FAMILY MEDICINE

## 2022-03-29 PROCEDURE — 3008F PR BODY MASS INDEX (BMI) DOCUMENTED: ICD-10-PCS | Mod: CPTII,,, | Performed by: FAMILY MEDICINE

## 2022-03-29 PROCEDURE — 99213 PR OFFICE/OUTPT VISIT, EST, LEVL III, 20-29 MIN: ICD-10-PCS | Mod: ,,, | Performed by: FAMILY MEDICINE

## 2022-03-29 PROCEDURE — 3077F SYST BP >= 140 MM HG: CPT | Mod: CPTII,,, | Performed by: FAMILY MEDICINE

## 2022-03-29 PROCEDURE — 3008F BODY MASS INDEX DOCD: CPT | Mod: CPTII,,, | Performed by: FAMILY MEDICINE
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
## 2022-03-30 LAB
BILIRUB UR QL STRIP: NEGATIVE
CLARITY UR: CLEAR
COLOR UR: YELLOW
GLUCOSE UR STRIP-MCNC: NEGATIVE MG/DL
KETONES UR STRIP-SCNC: NEGATIVE MG/DL
LEUKOCYTE ESTERASE UR QL STRIP: NEGATIVE
NITRITE UR QL STRIP: NEGATIVE
PH UR STRIP: 6 PH UNITS
PROT UR QL STRIP: NEGATIVE
RBC # UR STRIP: NEGATIVE /UL
SP GR UR STRIP: 1.02
UROBILINOGEN UR STRIP-ACNC: 0.2 MG/DL

## 2022-03-30 PROCEDURE — 81003 URINALYSIS, REFLEX TO URINE CULTURE: ICD-10-PCS | Mod: QW,,, | Performed by: CLINICAL MEDICAL LABORATORY

## 2022-03-30 PROCEDURE — 81003 URINALYSIS AUTO W/O SCOPE: CPT | Mod: QW,,, | Performed by: CLINICAL MEDICAL LABORATORY

## 2022-04-06 ENCOUNTER — HOSPITAL ENCOUNTER (OUTPATIENT)
Dept: RADIOLOGY | Facility: HOSPITAL | Age: 67
Discharge: HOME | End: 2022-04-06
Attending: FAMILY MEDICINE
Payer: COMMERCIAL

## 2022-04-06 DIAGNOSIS — R41.3 MEMORY IMPAIRMENT: ICD-10-CM

## 2022-04-06 PROCEDURE — 70553 MRI BRAIN STEM W/O & W/DYE: CPT | Mod: TC

## 2022-04-06 PROCEDURE — 25500020 PHARM REV CODE 255: Performed by: FAMILY MEDICINE

## 2022-04-06 RX ADMIN — GADOTERATE MEGLUMINE 16 ML: 376.9 INJECTION INTRAVENOUS at 09:04

## 2022-04-28 ENCOUNTER — OFFICE VISIT (OUTPATIENT)
Dept: FAMILY MEDICINE | Facility: CLINIC | Age: 67
End: 2022-04-28
Payer: COMMERCIAL

## 2022-04-28 VITALS
WEIGHT: 180.81 LBS | HEIGHT: 67 IN | OXYGEN SATURATION: 97 % | SYSTOLIC BLOOD PRESSURE: 144 MMHG | RESPIRATION RATE: 16 BRPM | BODY MASS INDEX: 28.38 KG/M2 | TEMPERATURE: 99 F | HEART RATE: 83 BPM | DIASTOLIC BLOOD PRESSURE: 80 MMHG

## 2022-04-28 DIAGNOSIS — I10 HYPERTENSION, UNSPECIFIED TYPE: ICD-10-CM

## 2022-04-28 DIAGNOSIS — M19.90 ARTHRITIS: ICD-10-CM

## 2022-04-28 DIAGNOSIS — E78.5 HYPERLIPIDEMIA, UNSPECIFIED HYPERLIPIDEMIA TYPE: ICD-10-CM

## 2022-04-28 DIAGNOSIS — Z87.820 HISTORY OF TRAUMATIC BRAIN INJURY: ICD-10-CM

## 2022-04-28 DIAGNOSIS — F32.A DEPRESSION, UNSPECIFIED DEPRESSION TYPE: ICD-10-CM

## 2022-04-28 DIAGNOSIS — R41.3 MEMORY IMPAIRMENT: Primary | ICD-10-CM

## 2022-04-28 PROCEDURE — 3077F SYST BP >= 140 MM HG: CPT | Mod: CPTII,,, | Performed by: FAMILY MEDICINE

## 2022-04-28 PROCEDURE — 3008F PR BODY MASS INDEX (BMI) DOCUMENTED: ICD-10-PCS | Mod: CPTII,,, | Performed by: FAMILY MEDICINE

## 2022-04-28 PROCEDURE — 3077F PR MOST RECENT SYSTOLIC BLOOD PRESSURE >= 140 MM HG: ICD-10-PCS | Mod: CPTII,,, | Performed by: FAMILY MEDICINE

## 2022-04-28 PROCEDURE — 99213 OFFICE O/P EST LOW 20 MIN: CPT | Mod: ,,, | Performed by: FAMILY MEDICINE

## 2022-04-28 PROCEDURE — 3008F BODY MASS INDEX DOCD: CPT | Mod: CPTII,,, | Performed by: FAMILY MEDICINE

## 2022-04-28 PROCEDURE — 99213 PR OFFICE/OUTPT VISIT, EST, LEVL III, 20-29 MIN: ICD-10-PCS | Mod: ,,, | Performed by: FAMILY MEDICINE

## 2022-04-28 PROCEDURE — 1159F MED LIST DOCD IN RCRD: CPT | Mod: CPTII,,, | Performed by: FAMILY MEDICINE

## 2022-04-28 PROCEDURE — 3079F PR MOST RECENT DIASTOLIC BLOOD PRESSURE 80-89 MM HG: ICD-10-PCS | Mod: CPTII,,, | Performed by: FAMILY MEDICINE

## 2022-04-28 PROCEDURE — 1159F PR MEDICATION LIST DOCUMENTED IN MEDICAL RECORD: ICD-10-PCS | Mod: CPTII,,, | Performed by: FAMILY MEDICINE

## 2022-04-28 PROCEDURE — 3079F DIAST BP 80-89 MM HG: CPT | Mod: CPTII,,, | Performed by: FAMILY MEDICINE

## 2022-04-28 RX ORDER — OMEPRAZOLE 20 MG/1
20 CAPSULE, DELAYED RELEASE ORAL DAILY
COMMUNITY
Start: 2022-02-28 | End: 2022-04-28 | Stop reason: SDUPTHER

## 2022-04-28 RX ORDER — PRAVASTATIN SODIUM 40 MG/1
40 TABLET ORAL NIGHTLY
Qty: 30 TABLET | Refills: 5 | Status: SHIPPED | OUTPATIENT
Start: 2022-04-28 | End: 2022-08-02 | Stop reason: SDUPTHER

## 2022-04-28 RX ORDER — LISINOPRIL AND HYDROCHLOROTHIAZIDE 12.5; 2 MG/1; MG/1
1 TABLET ORAL DAILY
Qty: 90 TABLET | Refills: 1 | Status: SHIPPED | OUTPATIENT
Start: 2022-04-28 | End: 2022-08-02 | Stop reason: SDUPTHER

## 2022-04-28 RX ORDER — DICLOFENAC SODIUM 20 MG/G
1 SOLUTION TOPICAL EVERY 6 HOURS PRN
Qty: 112 G | Refills: 1 | Status: CANCELLED | OUTPATIENT
Start: 2022-04-28

## 2022-04-28 RX ORDER — OMEPRAZOLE 20 MG/1
20 CAPSULE, DELAYED RELEASE ORAL DAILY
Qty: 90 CAPSULE | Refills: 1 | Status: SHIPPED | OUTPATIENT
Start: 2022-04-28 | End: 2022-08-02 | Stop reason: SDUPTHER

## 2022-04-28 RX ORDER — PAROXETINE 30 MG/1
30 TABLET, FILM COATED ORAL EVERY MORNING
Qty: 90 TABLET | Refills: 1 | Status: SHIPPED | OUTPATIENT
Start: 2022-04-28 | End: 2022-08-02 | Stop reason: SDUPTHER

## 2022-04-28 RX ORDER — MELOXICAM 7.5 MG/1
7.5 TABLET ORAL DAILY
Qty: 90 TABLET | Refills: 1 | Status: SHIPPED | OUTPATIENT
Start: 2022-04-28 | End: 2022-08-02 | Stop reason: SDUPTHER
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
## 2022-06-17 ENCOUNTER — OFFICE VISIT (OUTPATIENT)
Dept: NEUROLOGY | Facility: CLINIC | Age: 67
End: 2022-06-17
Payer: MEDICARE

## 2022-06-17 VITALS
WEIGHT: 173.81 LBS | OXYGEN SATURATION: 98 % | DIASTOLIC BLOOD PRESSURE: 80 MMHG | HEART RATE: 70 BPM | HEIGHT: 67 IN | BODY MASS INDEX: 27.28 KG/M2 | SYSTOLIC BLOOD PRESSURE: 140 MMHG | RESPIRATION RATE: 18 BRPM

## 2022-06-17 DIAGNOSIS — Z87.820 HISTORY OF TRAUMATIC BRAIN INJURY: ICD-10-CM

## 2022-06-17 DIAGNOSIS — R41.3 MEMORY IMPAIRMENT: Primary | ICD-10-CM

## 2022-06-17 PROCEDURE — 99214 OFFICE O/P EST MOD 30 MIN: CPT | Mod: PBBFAC | Performed by: PSYCHIATRY & NEUROLOGY

## 2022-06-17 PROCEDURE — 99204 OFFICE O/P NEW MOD 45 MIN: CPT | Mod: S$PBB,,, | Performed by: PSYCHIATRY & NEUROLOGY

## 2022-06-17 PROCEDURE — 99204 PR OFFICE/OUTPT VISIT, NEW, LEVL IV, 45-59 MIN: ICD-10-PCS | Mod: S$PBB,,, | Performed by: PSYCHIATRY & NEUROLOGY
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
## 2022-06-28 ENCOUNTER — TELEPHONE (OUTPATIENT)
Dept: FAMILY MEDICINE | Facility: CLINIC | Age: 67
End: 2022-06-28
Payer: MEDICARE

## 2022-06-28 NOTE — TELEPHONE ENCOUNTER
Pt called and stated she fell a couple days ago and hurt her  Right foot.  Stated she had some swelling and bruising.  Pt stated her foot look better and was not as swollen at the day before, the redness and bruising was not as dark.  States she just wanted to see if she should come to clinic to have it evaluated.  States she had no pain, and can apply pressure on foot.  Instructed pt she could use ice pack for any pain, swelling, discomfort, she could take otc ibuprophen, tyelnol for pain / discomfort.  Instructed her to prop foot as well. Instructed pt to rtc or to to ER  if the swelling/pain/discoloration gets worse.  Pt voiced understanding.

## 2022-07-18 DIAGNOSIS — Z71.89 COMPLEX CARE COORDINATION: ICD-10-CM

## 2022-08-02 ENCOUNTER — OFFICE VISIT (OUTPATIENT)
Dept: FAMILY MEDICINE | Facility: CLINIC | Age: 67
End: 2022-08-02
Payer: MEDICARE

## 2022-08-02 VITALS
DIASTOLIC BLOOD PRESSURE: 98 MMHG | HEART RATE: 71 BPM | BODY MASS INDEX: 27.06 KG/M2 | RESPIRATION RATE: 20 BRPM | WEIGHT: 172.38 LBS | SYSTOLIC BLOOD PRESSURE: 198 MMHG | HEIGHT: 67 IN | TEMPERATURE: 98 F | OXYGEN SATURATION: 98 %

## 2022-08-02 DIAGNOSIS — M19.90 ARTHRITIS: ICD-10-CM

## 2022-08-02 DIAGNOSIS — E78.5 HYPERLIPIDEMIA, UNSPECIFIED HYPERLIPIDEMIA TYPE: ICD-10-CM

## 2022-08-02 DIAGNOSIS — I10 HYPERTENSION, UNSPECIFIED TYPE: ICD-10-CM

## 2022-08-02 DIAGNOSIS — F32.A DEPRESSION, UNSPECIFIED DEPRESSION TYPE: ICD-10-CM

## 2022-08-02 PROCEDURE — 99214 OFFICE O/P EST MOD 30 MIN: CPT | Mod: ,,, | Performed by: NURSE PRACTITIONER

## 2022-08-02 PROCEDURE — 99214 PR OFFICE/OUTPT VISIT, EST, LEVL IV, 30-39 MIN: ICD-10-PCS | Mod: ,,, | Performed by: NURSE PRACTITIONER

## 2022-08-02 RX ORDER — PRAVASTATIN SODIUM 40 MG/1
40 TABLET ORAL NIGHTLY
Qty: 90 TABLET | Refills: 1 | Status: SHIPPED | OUTPATIENT
Start: 2022-08-02 | End: 2022-11-09 | Stop reason: SDUPTHER

## 2022-08-02 RX ORDER — OMEPRAZOLE 20 MG/1
20 CAPSULE, DELAYED RELEASE ORAL DAILY
Qty: 90 CAPSULE | Refills: 1 | Status: SHIPPED | OUTPATIENT
Start: 2022-08-02 | End: 2022-11-09 | Stop reason: SDUPTHER

## 2022-08-02 RX ORDER — LISINOPRIL AND HYDROCHLOROTHIAZIDE 12.5; 2 MG/1; MG/1
1 TABLET ORAL DAILY
Qty: 90 TABLET | Refills: 1 | Status: SHIPPED | OUTPATIENT
Start: 2022-08-02 | End: 2022-11-09 | Stop reason: SDUPTHER

## 2022-08-02 RX ORDER — LISINOPRIL AND HYDROCHLOROTHIAZIDE 12.5; 2 MG/1; MG/1
1 TABLET ORAL DAILY
Qty: 90 TABLET | Refills: 1 | Status: SHIPPED | OUTPATIENT
Start: 2022-08-02 | End: 2022-08-02 | Stop reason: SDUPTHER

## 2022-08-02 RX ORDER — PAROXETINE 30 MG/1
30 TABLET, FILM COATED ORAL EVERY MORNING
Qty: 90 TABLET | Refills: 1 | Status: SHIPPED | OUTPATIENT
Start: 2022-08-02 | End: 2022-11-09 | Stop reason: SDUPTHER

## 2022-08-02 RX ORDER — MELOXICAM 7.5 MG/1
7.5 TABLET ORAL DAILY
Qty: 90 TABLET | Refills: 1 | Status: SHIPPED | OUTPATIENT
Start: 2022-08-02 | End: 2022-11-09 | Stop reason: SDUPTHER

## 2022-08-02 NOTE — PROGRESS NOTES
Starr Dietz NP   [facility]  [address]  [place]     PATIENT NAME: Lucita Bonilla  : 1955  DATE: 22  MRN: [id number]      Billing Provider: Starr Dietz NP  Level of Service:   Patient PCP Information     Provider PCP Type    Zaki Damico DO General          Reason for Visit / Chief Complaint: Medication Refill       Update PCP  Update Chief Complaint         History of Present Illness / Problem Focused Workflow     Lucita Bonilla presents to the clinic pt is here for refills on meds, htn hyperlipidemia, discussed bp being high today, when discussing increasing bp meds, she finally said well I havent been taking my meds, then she stated well Marifer been taking some but dont know what marifer been taking, stated that her brother told her to stop med and take only ibuprofen. She does not have her meds, will go home and get them and come back to office,I questioned her as to if she would be able to tell me what she had been taking when she had her bottles, she stated that maybe she could tell me but was not sure. Pt left to go get meds    Pt returned with med, she did not have any bp meds in her bag. Agreed to have [facility] home [facility] to see her and monitor bp for a few weeks.     Pt was very confused about meds, we did call her brother to let him know of our concerns      Review of Systems     Review of Systems   Constitutional: Negative for chills, fatigue and fever.   HENT: Negative for nasal congestion, ear pain, facial swelling, hearing loss, mouth dryness, mouth sores, postnasal drip, rhinorrhea, sinus pressure/congestion and goiter.    Eyes: Negative for discharge and itching.   Respiratory: Negative for cough, shortness of breath and wheezing.    Cardiovascular: Negative for chest pain and leg swelling.   Gastrointestinal: Negative for abdominal pain, change in bowel habit and change in bowel habit.   Genitourinary: Negative for difficulty urinating, dysuria, enuresis,  frequency, hematuria and urgency.   Neurological: Negative for dizziness, vertigo, syncope, weakness and headaches.   Psychiatric/Behavioral: Positive for confusion. Negative for decreased concentration.        Medical / Social / Family History     Past Medical History:   Diagnosis Date    Adult BMI 25.0-25.9 kg/sq m     Anxiety     Arthritis     Calculus of gallbladder and bile duct w/o cholecystitis or obstruction     Carpal tunnel syndrome     Depression     GERD (gastroesophageal reflux disease)     History of traumatic brain injury     Hyperlipidemia     Hypertension     Memory impairment     Minimal cognitive impairment     Rosacea     Scoliosis of lumbar spine     Vitamin D deficiency     Xerosis cutis        Past Surgical History:   Procedure Laterality Date    BRAIN SURGERY      COLONOSCOPY  01/20/2012    HYSTERECTOMY      TONSILLECTOMY         Social History  Ms.  reports that she has quit smoking. She has never used smokeless tobacco. She reports that she does not drink alcohol and does not use drugs.    Family History  Ms.'s family history includes Heart disease in her father; Hypertension in her brother, father, and mother; Thyroid disease in her mother.    Medications and Allergies     Medications  Outpatient Medications Marked as Taking for the 8/2/22 encounter (Office Visit) with Starr Dietz NP   Medication Sig Dispense Refill    calcium carb-D3-mag ox-zinc ox (ALEX MAG ZINC PLUS D3) 333 mg-133 unit -133 mg-5 mg Tab Take 1 tablet by mouth once daily.      [DISCONTINUED] lisinopriL-hydrochlorothiazide (PRINZIDE,ZESTORETIC) 20-12.5 mg per tablet Take 1 tablet by mouth once daily. 90 tablet 1    [DISCONTINUED] meloxicam (MOBIC) 7.5 MG tablet Take 1 tablet (7.5 mg total) by mouth once daily. 90 tablet 1    [DISCONTINUED] paroxetine (PAXIL) 30 MG tablet Take 1 tablet (30 mg total) by mouth every morning. 90 tablet 1    [DISCONTINUED] pravastatin (PRAVACHOL) 40 MG tablet Take 1  "tablet (40 mg total) by mouth every evening. 30 tablet 5       Allergies  Review of patient's allergies indicates:   Allergen Reactions    Amoxicillin Itching       Physical Examination     Vitals:    08/02/22 0909   BP: (!) 198/98   BP Location: Left arm   Patient Position: Sitting   Pulse: 71   Resp: 20   Temp: 98.1 °F (36.7 °C)   TempSrc: Oral   SpO2: 98%   Weight: 78.2 kg (172 lb 6.4 oz)   Height: 5' 7" (1.702 m)      Physical Exam  Vitals and nursing note reviewed.   Constitutional:       Appearance: Normal appearance.   HENT:      Head: Normocephalic.      Right Ear: Tympanic membrane, ear canal and external ear normal.      Left Ear: Tympanic membrane, ear canal and external ear normal.      Nose: Nose normal.      Mouth/Throat:      Mouth: Mucous membranes are moist.      Pharynx: Oropharynx is clear.   Eyes:      Extraocular Movements: Extraocular movements intact.      Conjunctiva/sclera: Conjunctivae normal.      Pupils: Pupils are equal, round, and reactive to light.   Cardiovascular:      Rate and Rhythm: Normal rate and regular rhythm.      Pulses: Normal pulses.      Heart sounds: Normal heart sounds.   Pulmonary:      Effort: Pulmonary effort is normal.      Breath sounds: Normal breath sounds.   Abdominal:      General: Bowel sounds are normal.      Palpations: Abdomen is soft.   Musculoskeletal:         General: Normal range of motion.   Skin:     General: Skin is warm and dry.      Capillary Refill: Capillary refill takes less than 2 seconds.   Neurological:      General: No focal deficit present.      Mental Status: She is alert and oriented to person, place, and time.      Comments: Pt is confused about medication   Psychiatric:         Mood and Affect: Mood normal.         Behavior: Behavior normal.          Assessment and Plan (including Health Maintenance)      Problem List  Smart Sets  Document Outside HM   :    Plan: stressed to take medication as ordered, [facility] to see pt and eval. Return " to clinic in 3 months as scheduled and as needed    Hypertension, unspecified type  -     lisinopriL-hydrochlorothiazide (PRINZIDE,ZESTORETIC) 20-12.5 mg per tablet; Take 1 tablet by mouth once daily.  Dispense: 90 tablet; Refill: 1    Arthritis  -     meloxicam (MOBIC) 7.5 MG tablet; Take 1 tablet (7.5 mg total) by mouth once daily.  Dispense: 90 tablet; Refill: 1    Depression, unspecified depression type  -     paroxetine (PAXIL) 30 MG tablet; Take 1 tablet (30 mg total) by mouth every morning.  Dispense: 90 tablet; Refill: 1    Hyperlipidemia, unspecified hyperlipidemia type  -     pravastatin (PRAVACHOL) 40 MG tablet; Take 1 tablet (40 mg total) by mouth every evening.  Dispense: 90 tablet; Refill: 1    Other orders  -     omeprazole (PRILOSEC) 20 MG capsule; Take 1 capsule (20 mg total) by mouth once daily.  Dispense: 90 capsule; Refill: 1            Health Maintenance Due   Topic Date Due    Hepatitis C Screening  Never done    DEXA Scan  Never done    Colorectal Cancer Screening  01/20/2022    Mammogram  04/19/2022    COVID-19 Vaccine (4 - Booster for Moderna series) 04/28/2022    Pneumococcal Vaccines (Age 65+) (1 - PCV) Never done       Problem List Items Addressed This Visit        Psychiatric    Depression    Relevant Medications    paroxetine (PAXIL) 30 MG tablet       Cardiac/Vascular    Hyperlipidemia    Relevant Medications    pravastatin (PRAVACHOL) 40 MG tablet    Hypertension    Relevant Medications    lisinopriL-hydrochlorothiazide (PRINZIDE,ZESTORETIC) 20-12.5 mg per tablet       Orthopedic    Arthritis    Relevant Medications    meloxicam (MOBIC) 7.5 MG tablet            Health Maintenance Topics with due status: Not Due       Topic Last Completion Date    TETANUS VACCINE 11/09/2019    Lipid Panel 07/29/2021    Influenza Vaccine 10/08/2021       Procedures     Future Appointments   Date Time Provider Department Center   12/19/2022  1:00 PM Joe Orozco MD [facility]        No  follow-ups on file.       Signature:  Starr Dietz NP    Date of encounter: 8/2/22

## 2022-11-09 ENCOUNTER — OFFICE VISIT (OUTPATIENT)
Dept: FAMILY MEDICINE | Facility: CLINIC | Age: 67
End: 2022-11-09
Payer: MEDICARE

## 2022-11-09 VITALS
TEMPERATURE: 99 F | HEIGHT: 67 IN | SYSTOLIC BLOOD PRESSURE: 138 MMHG | RESPIRATION RATE: 20 BRPM | WEIGHT: 177 LBS | OXYGEN SATURATION: 96 % | BODY MASS INDEX: 27.78 KG/M2 | HEART RATE: 72 BPM | DIASTOLIC BLOOD PRESSURE: 74 MMHG

## 2022-11-09 DIAGNOSIS — Z23 ENCOUNTER FOR IMMUNIZATION: ICD-10-CM

## 2022-11-09 DIAGNOSIS — M19.90 ARTHRITIS: ICD-10-CM

## 2022-11-09 DIAGNOSIS — I10 HYPERTENSION, UNSPECIFIED TYPE: Primary | ICD-10-CM

## 2022-11-09 DIAGNOSIS — F32.A DEPRESSION, UNSPECIFIED DEPRESSION TYPE: ICD-10-CM

## 2022-11-09 DIAGNOSIS — E78.5 HYPERLIPIDEMIA, UNSPECIFIED HYPERLIPIDEMIA TYPE: ICD-10-CM

## 2022-11-09 LAB
ALBUMIN SERPL BCP-MCNC: 3.8 G/DL (ref 3.5–5)
ALBUMIN/GLOB SERPL: 1 {RATIO}
ALP SERPL-CCNC: 108 U/L (ref 55–142)
ALT SERPL W P-5'-P-CCNC: 13 U/L (ref 13–56)
ANION GAP SERPL CALCULATED.3IONS-SCNC: 7 MMOL/L (ref 7–16)
AST SERPL W P-5'-P-CCNC: 18 U/L (ref 15–37)
BASOPHILS # BLD AUTO: 0.07 K/UL (ref 0–0.2)
BASOPHILS NFR BLD AUTO: 0.9 % (ref 0–1)
BILIRUB SERPL-MCNC: 0.4 MG/DL (ref ?–1.2)
BUN SERPL-MCNC: 16 MG/DL (ref 7–18)
BUN/CREAT SERPL: 22 (ref 6–20)
CALCIUM SERPL-MCNC: 9.4 MG/DL (ref 8.5–10.1)
CHLORIDE SERPL-SCNC: 103 MMOL/L (ref 98–107)
CHOLEST SERPL-MCNC: 219 MG/DL (ref 0–200)
CHOLEST/HDLC SERPL: 5 {RATIO}
CO2 SERPL-SCNC: 29 MMOL/L (ref 21–32)
CREAT SERPL-MCNC: 0.74 MG/DL (ref 0.55–1.02)
CREAT UR-MCNC: 73 MG/DL (ref 28–219)
DIFFERENTIAL METHOD BLD: ABNORMAL
EGFR (NO RACE VARIABLE) (RUSH/TITUS): 90 ML/MIN/1.73M²
EOSINOPHIL # BLD AUTO: 0.09 K/UL (ref 0–0.5)
EOSINOPHIL NFR BLD AUTO: 1.2 % (ref 1–4)
ERYTHROCYTE [DISTWIDTH] IN BLOOD BY AUTOMATED COUNT: 12.9 % (ref 11.5–14.5)
GLOBULIN SER-MCNC: 3.9 G/DL (ref 2–4)
GLUCOSE SERPL-MCNC: 102 MG/DL (ref 74–106)
HCT VFR BLD AUTO: 42.2 % (ref 38–47)
HDLC SERPL-MCNC: 44 MG/DL (ref 40–60)
HGB BLD-MCNC: 13.6 G/DL (ref 12–16)
IMM GRANULOCYTES # BLD AUTO: 0.01 K/UL (ref 0–0.04)
IMM GRANULOCYTES NFR BLD: 0.1 % (ref 0–0.4)
LDLC SERPL CALC-MCNC: 159 MG/DL
LDLC/HDLC SERPL: 3.6 {RATIO}
LYMPHOCYTES # BLD AUTO: 2.03 K/UL (ref 1–4.8)
LYMPHOCYTES NFR BLD AUTO: 26 % (ref 27–41)
MCH RBC QN AUTO: 29.8 PG (ref 27–31)
MCHC RBC AUTO-ENTMCNC: 32.2 G/DL (ref 32–36)
MCV RBC AUTO: 92.3 FL (ref 80–96)
MICROALBUMIN UR-MCNC: 0.8 MG/DL (ref 0–2.8)
MICROALBUMIN/CREAT RATIO PNL UR: 11 MG/G (ref 0–30)
MONOCYTES # BLD AUTO: 0.73 K/UL (ref 0–0.8)
MONOCYTES NFR BLD AUTO: 9.3 % (ref 2–6)
MPC BLD CALC-MCNC: 9.2 FL (ref 9.4–12.4)
NEUTROPHILS # BLD AUTO: 4.89 K/UL (ref 1.8–7.7)
NEUTROPHILS NFR BLD AUTO: 62.5 % (ref 53–65)
NONHDLC SERPL-MCNC: 175 MG/DL
NRBC # BLD AUTO: 0 X10E3/UL
NRBC, AUTO (.00): 0 %
PLATELET # BLD AUTO: 381 K/UL (ref 150–400)
POTASSIUM SERPL-SCNC: 3.9 MMOL/L (ref 3.5–5.1)
PROT SERPL-MCNC: 7.7 G/DL (ref 6.4–8.2)
RBC # BLD AUTO: 4.57 M/UL (ref 4.2–5.4)
SODIUM SERPL-SCNC: 135 MMOL/L (ref 136–145)
TRIGL SERPL-MCNC: 80 MG/DL (ref 35–150)
VLDLC SERPL-MCNC: 16 MG/DL
WBC # BLD AUTO: 7.82 K/UL (ref 4.5–11)

## 2022-11-09 PROCEDURE — 80053 COMPREHENSIVE METABOLIC PANEL: ICD-10-PCS | Mod: ,,, | Performed by: CLINICAL MEDICAL LABORATORY

## 2022-11-09 PROCEDURE — 82570 MICROALBUMIN / CREATININE RATIO URINE: ICD-10-PCS | Mod: ,,, | Performed by: CLINICAL MEDICAL LABORATORY

## 2022-11-09 PROCEDURE — 82043 UR ALBUMIN QUANTITATIVE: CPT | Mod: ,,, | Performed by: CLINICAL MEDICAL LABORATORY

## 2022-11-09 PROCEDURE — 90694 FLU VACCINE - QUADRIVALENT - ADJUVANTED: ICD-10-PCS | Mod: ,,, | Performed by: NURSE PRACTITIONER

## 2022-11-09 PROCEDURE — G0008 ADMIN INFLUENZA VIRUS VAC: HCPCS | Mod: ,,, | Performed by: NURSE PRACTITIONER

## 2022-11-09 PROCEDURE — G0008 FLU VACCINE - QUADRIVALENT - ADJUVANTED: ICD-10-PCS | Mod: ,,, | Performed by: NURSE PRACTITIONER

## 2022-11-09 PROCEDURE — 80061 LIPID PANEL: ICD-10-PCS | Mod: ,,, | Performed by: CLINICAL MEDICAL LABORATORY

## 2022-11-09 PROCEDURE — 82043 MICROALBUMIN / CREATININE RATIO URINE: ICD-10-PCS | Mod: ,,, | Performed by: CLINICAL MEDICAL LABORATORY

## 2022-11-09 PROCEDURE — 99214 PR OFFICE/OUTPT VISIT, EST, LEVL IV, 30-39 MIN: ICD-10-PCS | Mod: ,,, | Performed by: NURSE PRACTITIONER

## 2022-11-09 PROCEDURE — 99214 OFFICE O/P EST MOD 30 MIN: CPT | Mod: ,,, | Performed by: NURSE PRACTITIONER

## 2022-11-09 PROCEDURE — 90694 VACC AIIV4 NO PRSRV 0.5ML IM: CPT | Mod: ,,, | Performed by: NURSE PRACTITIONER

## 2022-11-09 PROCEDURE — 80061 LIPID PANEL: CPT | Mod: ,,, | Performed by: CLINICAL MEDICAL LABORATORY

## 2022-11-09 PROCEDURE — 85025 COMPLETE CBC W/AUTO DIFF WBC: CPT | Mod: ,,, | Performed by: CLINICAL MEDICAL LABORATORY

## 2022-11-09 PROCEDURE — 82570 ASSAY OF URINE CREATININE: CPT | Mod: ,,, | Performed by: CLINICAL MEDICAL LABORATORY

## 2022-11-09 PROCEDURE — 85025 CBC WITH DIFFERENTIAL: ICD-10-PCS | Mod: ,,, | Performed by: CLINICAL MEDICAL LABORATORY

## 2022-11-09 PROCEDURE — 80053 COMPREHEN METABOLIC PANEL: CPT | Mod: ,,, | Performed by: CLINICAL MEDICAL LABORATORY

## 2022-11-09 RX ORDER — MELOXICAM 7.5 MG/1
7.5 TABLET ORAL DAILY
Qty: 90 TABLET | Refills: 1 | Status: SHIPPED | OUTPATIENT
Start: 2022-11-09 | End: 2023-05-15 | Stop reason: SDUPTHER

## 2022-11-09 RX ORDER — OMEPRAZOLE 20 MG/1
20 CAPSULE, DELAYED RELEASE ORAL DAILY
Qty: 90 CAPSULE | Refills: 1 | Status: SHIPPED | OUTPATIENT
Start: 2022-11-09 | End: 2023-05-15 | Stop reason: SDUPTHER

## 2022-11-09 RX ORDER — PAROXETINE 30 MG/1
30 TABLET, FILM COATED ORAL EVERY MORNING
Qty: 90 TABLET | Refills: 1 | Status: SHIPPED | OUTPATIENT
Start: 2022-11-09 | End: 2023-05-15 | Stop reason: SDUPTHER

## 2022-11-09 RX ORDER — LISINOPRIL AND HYDROCHLOROTHIAZIDE 12.5; 2 MG/1; MG/1
1 TABLET ORAL DAILY
Qty: 90 TABLET | Refills: 1 | Status: SHIPPED | OUTPATIENT
Start: 2022-11-09 | End: 2023-05-15 | Stop reason: SDUPTHER

## 2022-11-09 RX ORDER — PRAVASTATIN SODIUM 40 MG/1
40 TABLET ORAL NIGHTLY
Qty: 90 TABLET | Refills: 1 | Status: SHIPPED | OUTPATIENT
Start: 2022-11-09 | End: 2023-05-15 | Stop reason: SDUPTHER

## 2022-11-09 NOTE — PROGRESS NOTES
Starr Dietz NP   [facility]  [address]  [place]     PATIENT NAME: Lucita Bonilla  : 1955  DATE: 22  MRN: [id number]      Billing Provider: Starr Dietz NP  Level of Service:   Patient PCP Information       Provider PCP Type    Starr Dietz NP General            Reason for Visit / Chief Complaint: Hypertension and Follow-up (6 month f/u - need refills and fasting labs)       Update PCP  Update Chief Complaint         History of Present Illness / Problem Focused Workflow     Lucita Bonilla presents to the clinic   Htn, f/u , needs refills and fasting labs, voices n/c,stated memory seems to be better and she is now taking meds as she is supposed to be taking them, per niece    Review of Systems     Review of Systems   Constitutional:  Negative for chills, fatigue and fever.   HENT:  Negative for nasal congestion, ear pain, facial swelling, hearing loss, mouth dryness, mouth sores, postnasal drip, rhinorrhea, sinus pressure/congestion and goiter.    Eyes:  Negative for discharge and itching.   Respiratory:  Negative for cough, shortness of breath and wheezing.    Cardiovascular:  Negative for chest pain and leg swelling.   Gastrointestinal:  Negative for abdominal pain, change in bowel habit and change in bowel habit.   Genitourinary:  Negative for difficulty urinating, dysuria, enuresis, frequency, hematuria and urgency.   Neurological:  Negative for dizziness, vertigo, syncope, weakness and headaches.   Psychiatric/Behavioral:  Negative for decreased concentration.       Medical / Social / Family History     Past Medical History:   Diagnosis Date    Adult BMI 25.0-25.9 kg/sq m     Anxiety     Arthritis     Calculus of gallbladder and bile duct w/o cholecystitis or obstruction     Carpal tunnel syndrome     Depression     GERD (gastroesophageal reflux disease)     History of traumatic brain injury     Hyperlipidemia     Hypertension     Memory impairment     Minimal  "cognitive impairment     Rosacea     Scoliosis of lumbar spine     Vitamin D deficiency     Xerosis cutis        Past Surgical History:   Procedure Laterality Date    BRAIN SURGERY      COLONOSCOPY  01/20/2012    HYSTERECTOMY      TONSILLECTOMY         Social History  Ms.  reports that she has quit smoking. She has never used smokeless tobacco. She reports that she does not drink alcohol and does not use drugs.    Family History  Ms.'s family history includes Heart disease in her father; Hypertension in her brother, father, and mother; Thyroid disease in her mother.    Medications and Allergies     Medications  Outpatient Medications Marked as Taking for the 11/9/22 encounter (Office Visit) with Starr Dietz NP   Medication Sig Dispense Refill    calcium carb-D3-mag ox-zinc ox (ALEX MAG ZINC PLUS D3) 333 mg-133 unit -133 mg-5 mg Tab Take 1 tablet by mouth once daily.      [DISCONTINUED] lisinopriL-hydrochlorothiazide (PRINZIDE,ZESTORETIC) 20-12.5 mg per tablet Take 1 tablet by mouth once daily. 90 tablet 1    [DISCONTINUED] meloxicam (MOBIC) 7.5 MG tablet Take 1 tablet (7.5 mg total) by mouth once daily. 90 tablet 1    [DISCONTINUED] omeprazole (PRILOSEC) 20 MG capsule Take 1 capsule (20 mg total) by mouth once daily. 90 capsule 1    [DISCONTINUED] paroxetine (PAXIL) 30 MG tablet Take 1 tablet (30 mg total) by mouth every morning. 90 tablet 1    [DISCONTINUED] pravastatin (PRAVACHOL) 40 MG tablet Take 1 tablet (40 mg total) by mouth every evening. 90 tablet 1       Allergies  Review of patient's allergies indicates:   Allergen Reactions    Amoxicillin Itching       Physical Examination     Vitals:    11/09/22 1023   BP: 138/74   BP Location: Left arm   Patient Position: Sitting   BP Method: Medium (Manual)   Pulse: 72   Resp: 20   Temp: 98.8 °F (37.1 °C)   TempSrc: Oral   SpO2: 96%   Weight: 80.3 kg (177 lb)   Height: 5' 7.01" (1.702 m)      Physical Exam  Constitutional:       Appearance: Normal appearance. "   HENT:      Head: Normocephalic.      Right Ear: Tympanic membrane, ear canal and external ear normal.      Left Ear: Tympanic membrane, ear canal and external ear normal.      Nose: Nose normal.      Mouth/Throat:      Mouth: Mucous membranes are moist.      Pharynx: Oropharynx is clear.   Eyes:      Extraocular Movements: Extraocular movements intact.      Conjunctiva/sclera: Conjunctivae normal.      Pupils: Pupils are equal, round, and reactive to light.   Cardiovascular:      Rate and Rhythm: Normal rate and regular rhythm.      Pulses: Normal pulses.      Heart sounds: Normal heart sounds.   Pulmonary:      Effort: Pulmonary effort is normal.      Breath sounds: Normal breath sounds.   Abdominal:      General: Bowel sounds are normal.      Palpations: Abdomen is soft.   Musculoskeletal:         General: Normal range of motion.   Skin:     General: Skin is warm and dry.      Capillary Refill: Capillary refill takes less than 2 seconds.   Neurological:      General: No focal deficit present.      Mental Status: She is alert and oriented to person, place, and time.   Psychiatric:         Mood and Affect: Mood normal.         Behavior: Behavior normal.        Assessment and Plan (including Health Maintenance)      Problem List  Smart Sets  Document Outside HM   :    Plan: cont all meds as ordered, return to clinic as needed    Hypertension, unspecified type  -     CBC Auto Differential; Future; Expected date: 11/09/2022  -     Comprehensive Metabolic Panel; Future; Expected date: 11/09/2022  -     Microalbumin/Creatinine Ratio, Urine; Future; Expected date: 11/09/2022  -     lisinopriL-hydrochlorothiazide (PRINZIDE,ZESTORETIC) 20-12.5 mg per tablet; Take 1 tablet by mouth once daily.  Dispense: 90 tablet; Refill: 1    Hyperlipidemia, unspecified hyperlipidemia type  -     Lipid Panel; Future; Expected date: 11/09/2022  -     pravastatin (PRAVACHOL) 40 MG tablet; Take 1 tablet (40 mg total) by mouth every evening.   Dispense: 90 tablet; Refill: 1    Encounter for immunization  -     Influenza (FLUAD) - Quadrivalent (Adjuvanted) *Preferred* (65+) (PF)    Arthritis  -     meloxicam (MOBIC) 7.5 MG tablet; Take 1 tablet (7.5 mg total) by mouth once daily.  Dispense: 90 tablet; Refill: 1    Depression, unspecified depression type  -     paroxetine (PAXIL) 30 MG tablet; Take 1 tablet (30 mg total) by mouth every morning.  Dispense: 90 tablet; Refill: 1    Other orders  -     omeprazole (PRILOSEC) 20 MG capsule; Take 1 capsule (20 mg total) by mouth once daily.  Dispense: 90 capsule; Refill: 1            Health Maintenance Due   Topic Date Due    Hepatitis C Screening  Never done    DEXA Scan  Never done    Shingles Vaccine (1 of 2) Never done    Colorectal Cancer Screening  01/20/2022    COVID-19 Vaccine (4 - Booster for Moderna series) 02/22/2022    Mammogram  04/19/2022    Pneumococcal Vaccines (Age 65+) (1 - PCV) Never done    Influenza Vaccine (1) 09/01/2022       Problem List Items Addressed This Visit          Psychiatric    Depression    Relevant Medications    paroxetine (PAXIL) 30 MG tablet       Cardiac/Vascular    Hyperlipidemia    Relevant Medications    pravastatin (PRAVACHOL) 40 MG tablet    Other Relevant Orders    Lipid Panel    Hypertension - Primary    Relevant Medications    lisinopriL-hydrochlorothiazide (PRINZIDE,ZESTORETIC) 20-12.5 mg per tablet    Other Relevant Orders    CBC Auto Differential    Comprehensive Metabolic Panel    Microalbumin/Creatinine Ratio, Urine       Orthopedic    Arthritis    Relevant Medications    meloxicam (MOBIC) 7.5 MG tablet     Other Visit Diagnoses       Encounter for immunization        Relevant Orders    Influenza (FLUAD) - Quadrivalent (Adjuvanted) *Preferred* (65+) (PF)              Health Maintenance Topics with due status: Not Due       Topic Last Completion Date    TETANUS VACCINE 11/09/2019    Lipid Panel 07/29/2021       Procedures     Future Appointments   Date Time  Provider Department [place]   12/19/2022  1:00 PM Joe Orozco MD [facility]        No follow-ups on file.       Signature:  Starr Dietz NP    Date of encounter: 11/9/22

## 2022-11-10 NOTE — PROGRESS NOTES
Na is just sl low, increase salt intake, chol sl elevated, watch low fat low chol diet, all other lab is good, rpeat in 6 months

## 2023-02-09 DIAGNOSIS — Z71.89 COMPLEX CARE COORDINATION: ICD-10-CM

## 2023-05-15 ENCOUNTER — OFFICE VISIT (OUTPATIENT)
Dept: FAMILY MEDICINE | Facility: CLINIC | Age: 68
End: 2023-05-15
Payer: MEDICARE

## 2023-05-15 VITALS
TEMPERATURE: 98 F | OXYGEN SATURATION: 95 % | HEART RATE: 67 BPM | DIASTOLIC BLOOD PRESSURE: 80 MMHG | HEIGHT: 67 IN | SYSTOLIC BLOOD PRESSURE: 139 MMHG | RESPIRATION RATE: 20 BRPM | WEIGHT: 178.38 LBS | BODY MASS INDEX: 28 KG/M2

## 2023-05-15 DIAGNOSIS — Z12.31 ENCOUNTER FOR SCREENING MAMMOGRAM FOR BREAST CANCER: ICD-10-CM

## 2023-05-15 DIAGNOSIS — Z23 ENCOUNTER FOR IMMUNIZATION: ICD-10-CM

## 2023-05-15 DIAGNOSIS — I10 HYPERTENSION, UNSPECIFIED TYPE: ICD-10-CM

## 2023-05-15 DIAGNOSIS — I10 PRIMARY HYPERTENSION: ICD-10-CM

## 2023-05-15 DIAGNOSIS — E78.5 HYPERLIPIDEMIA, UNSPECIFIED HYPERLIPIDEMIA TYPE: ICD-10-CM

## 2023-05-15 DIAGNOSIS — M19.90 ARTHRITIS: ICD-10-CM

## 2023-05-15 DIAGNOSIS — F32.A DEPRESSION, UNSPECIFIED DEPRESSION TYPE: ICD-10-CM

## 2023-05-15 DIAGNOSIS — Z12.11 SCREENING FOR MALIGNANT NEOPLASM OF COLON: Primary | ICD-10-CM

## 2023-05-15 LAB
ALBUMIN SERPL BCP-MCNC: 3.5 G/DL (ref 3.5–5)
ALBUMIN/GLOB SERPL: 0.9 {RATIO}
ALP SERPL-CCNC: 98 U/L (ref 55–142)
ALT SERPL W P-5'-P-CCNC: 11 U/L (ref 13–56)
ANION GAP SERPL CALCULATED.3IONS-SCNC: 7 MMOL/L (ref 7–16)
AST SERPL W P-5'-P-CCNC: 17 U/L (ref 15–37)
BASOPHILS # BLD AUTO: 0.05 K/UL (ref 0–0.2)
BASOPHILS NFR BLD AUTO: 0.7 % (ref 0–1)
BILIRUB SERPL-MCNC: 0.6 MG/DL (ref ?–1.2)
BUN SERPL-MCNC: 12 MG/DL (ref 7–18)
BUN/CREAT SERPL: 16 (ref 6–20)
CALCIUM SERPL-MCNC: 9.3 MG/DL (ref 8.5–10.1)
CHLORIDE SERPL-SCNC: 109 MMOL/L (ref 98–107)
CHOLEST SERPL-MCNC: 199 MG/DL (ref 0–200)
CHOLEST/HDLC SERPL: 5 {RATIO}
CO2 SERPL-SCNC: 28 MMOL/L (ref 21–32)
CREAT SERPL-MCNC: 0.76 MG/DL (ref 0.55–1.02)
DIFFERENTIAL METHOD BLD: ABNORMAL
EGFR (NO RACE VARIABLE) (RUSH/TITUS): 87 ML/MIN/1.73M2
EOSINOPHIL # BLD AUTO: 0.13 K/UL (ref 0–0.5)
EOSINOPHIL NFR BLD AUTO: 1.8 % (ref 1–4)
ERYTHROCYTE [DISTWIDTH] IN BLOOD BY AUTOMATED COUNT: 13 % (ref 11.5–14.5)
GLOBULIN SER-MCNC: 3.7 G/DL (ref 2–4)
GLUCOSE SERPL-MCNC: 105 MG/DL (ref 74–106)
HCT VFR BLD AUTO: 41.9 % (ref 38–47)
HDLC SERPL-MCNC: 40 MG/DL (ref 40–60)
HGB BLD-MCNC: 12.9 G/DL (ref 12–16)
IMM GRANULOCYTES # BLD AUTO: 0.01 K/UL (ref 0–0.04)
IMM GRANULOCYTES NFR BLD: 0.1 % (ref 0–0.4)
LDLC SERPL CALC-MCNC: 145 MG/DL
LDLC/HDLC SERPL: 3.6 {RATIO}
LYMPHOCYTES # BLD AUTO: 1.59 K/UL (ref 1–4.8)
LYMPHOCYTES NFR BLD AUTO: 22.5 % (ref 27–41)
MCH RBC QN AUTO: 28.9 PG (ref 27–31)
MCHC RBC AUTO-ENTMCNC: 30.8 G/DL (ref 32–36)
MCV RBC AUTO: 93.9 FL (ref 80–96)
MONOCYTES # BLD AUTO: 0.61 K/UL (ref 0–0.8)
MONOCYTES NFR BLD AUTO: 8.6 % (ref 2–6)
MPC BLD CALC-MCNC: 10 FL (ref 9.4–12.4)
NEUTROPHILS # BLD AUTO: 4.67 K/UL (ref 1.8–7.7)
NEUTROPHILS NFR BLD AUTO: 66.3 % (ref 53–65)
NONHDLC SERPL-MCNC: 159 MG/DL
NRBC # BLD AUTO: 0 X10E3/UL
NRBC, AUTO (.00): 0 %
PLATELET # BLD AUTO: 336 K/UL (ref 150–400)
POTASSIUM SERPL-SCNC: 5.2 MMOL/L (ref 3.5–5.1)
PROT SERPL-MCNC: 7.2 G/DL (ref 6.4–8.2)
RBC # BLD AUTO: 4.46 M/UL (ref 4.2–5.4)
SODIUM SERPL-SCNC: 139 MMOL/L (ref 136–145)
TRIGL SERPL-MCNC: 69 MG/DL (ref 35–150)
VLDLC SERPL-MCNC: 14 MG/DL
WBC # BLD AUTO: 7.06 K/UL (ref 4.5–11)

## 2023-05-15 PROCEDURE — 99214 PR OFFICE/OUTPT VISIT, EST, LEVL IV, 30-39 MIN: ICD-10-PCS | Mod: ,,, | Performed by: NURSE PRACTITIONER

## 2023-05-15 PROCEDURE — 90732 PNEUMOCOCCAL POLYSACCHARIDE VACCINE 23-VALENT =>2YO SQ IM: ICD-10-PCS | Mod: ,,, | Performed by: NURSE PRACTITIONER

## 2023-05-15 PROCEDURE — 82570 MICROALBUMIN / CREATININE RATIO URINE: ICD-10-PCS | Mod: ,,, | Performed by: CLINICAL MEDICAL LABORATORY

## 2023-05-15 PROCEDURE — 80053 COMPREHENSIVE METABOLIC PANEL: ICD-10-PCS | Mod: ,,, | Performed by: CLINICAL MEDICAL LABORATORY

## 2023-05-15 PROCEDURE — 80061 LIPID PANEL: CPT | Mod: ,,, | Performed by: CLINICAL MEDICAL LABORATORY

## 2023-05-15 PROCEDURE — G0009 PNEUMOCOCCAL POLYSACCHARIDE VACCINE 23-VALENT =>2YO SQ IM: ICD-10-PCS | Mod: ,,, | Performed by: NURSE PRACTITIONER

## 2023-05-15 PROCEDURE — 80061 LIPID PANEL: ICD-10-PCS | Mod: ,,, | Performed by: CLINICAL MEDICAL LABORATORY

## 2023-05-15 PROCEDURE — G0009 ADMIN PNEUMOCOCCAL VACCINE: HCPCS | Mod: ,,, | Performed by: NURSE PRACTITIONER

## 2023-05-15 PROCEDURE — 82570 ASSAY OF URINE CREATININE: CPT | Mod: ,,, | Performed by: CLINICAL MEDICAL LABORATORY

## 2023-05-15 PROCEDURE — 90732 PPSV23 VACC 2 YRS+ SUBQ/IM: CPT | Mod: ,,, | Performed by: NURSE PRACTITIONER

## 2023-05-15 PROCEDURE — 82043 MICROALBUMIN / CREATININE RATIO URINE: ICD-10-PCS | Mod: ,,, | Performed by: CLINICAL MEDICAL LABORATORY

## 2023-05-15 PROCEDURE — 85025 COMPLETE CBC W/AUTO DIFF WBC: CPT | Mod: ,,, | Performed by: CLINICAL MEDICAL LABORATORY

## 2023-05-15 PROCEDURE — 80053 COMPREHEN METABOLIC PANEL: CPT | Mod: ,,, | Performed by: CLINICAL MEDICAL LABORATORY

## 2023-05-15 PROCEDURE — 99214 OFFICE O/P EST MOD 30 MIN: CPT | Mod: ,,, | Performed by: NURSE PRACTITIONER

## 2023-05-15 PROCEDURE — 82043 UR ALBUMIN QUANTITATIVE: CPT | Mod: ,,, | Performed by: CLINICAL MEDICAL LABORATORY

## 2023-05-15 PROCEDURE — 85025 CBC WITH DIFFERENTIAL: ICD-10-PCS | Mod: ,,, | Performed by: CLINICAL MEDICAL LABORATORY

## 2023-05-15 RX ORDER — OMEPRAZOLE 20 MG/1
20 CAPSULE, DELAYED RELEASE ORAL DAILY
Qty: 90 CAPSULE | Refills: 2 | Status: SHIPPED | OUTPATIENT
Start: 2023-05-15

## 2023-05-15 RX ORDER — LISINOPRIL AND HYDROCHLOROTHIAZIDE 12.5; 2 MG/1; MG/1
1 TABLET ORAL DAILY
Qty: 90 TABLET | Refills: 2 | Status: SHIPPED | OUTPATIENT
Start: 2023-05-15

## 2023-05-15 RX ORDER — MELOXICAM 7.5 MG/1
7.5 TABLET ORAL DAILY
Qty: 90 TABLET | Refills: 2 | Status: SHIPPED | OUTPATIENT
Start: 2023-05-15

## 2023-05-15 RX ORDER — PRAVASTATIN SODIUM 40 MG/1
40 TABLET ORAL NIGHTLY
Qty: 90 TABLET | Refills: 2 | Status: SHIPPED | OUTPATIENT
Start: 2023-05-15

## 2023-05-15 RX ORDER — PAROXETINE 30 MG/1
30 TABLET, FILM COATED ORAL EVERY MORNING
Qty: 90 TABLET | Refills: 2 | Status: SHIPPED | OUTPATIENT
Start: 2023-05-15

## 2023-05-15 NOTE — PATIENT INSTRUCTIONS
Hepatitis C Screening Never done  decline  Hemoglobin A1c (Diabetic Prevention Screening) Never done  decline  DEXA Scan Never done  decline  Shingles Vaccine(1 of 2) Never done  decline  Colorectal Cancer Screening due on 01/20/2022  referral initiated  COVID-19 Vaccine(4 - Booster for Moderna series) due on 02/22/2022 decline  Mammogram due on 04/19/2022  referral initiated  Pneumococcal Vaccines (Age 65+)(1 - PCV) Never done  done today

## 2023-05-15 NOTE — PROGRESS NOTES
Starr Dietz NP   [facility]  [address]  [place]     PATIENT NAME: Lucita Bonilla  : 1955  DATE: 5/15/23  MRN: [id number]      Billing Provider: Starr Dietz NP  Level of Service:   Patient PCP Information       Provider PCP Type    Starr Dietz NP General            Reason for Visit / Chief Complaint: Follow-up (6 month f/u need refills on medication and fasting labs) and Health Maintenance (Hepatitis C Screening Never done  decline/Hemoglobin A1c (Diabetic Prevention Screening) Never done  decline/DEXA Scan Never done  decline/Shingles Vaccine(1 of 2) Never done  decline/Colorectal Cancer Screening due on 2022  referral initiated/COVID-19 Vaccine(4 - Booster for Moderna series) due on 2022 decline/Mammogram due on 2022  referral initiated/Pneumococcal Vaccines (Age 65+)(1 - PCV) Never done  done today)       Update PCP  Update Chief Complaint         History of Present Illness / Problem Focused Workflow     Lucita Bonilla presents to the clinic for 6 month f/u for htn, hyperlipidmeia eval, voices n/c      Review of Systems     Review of Systems   Constitutional:  Negative for chills, fatigue and fever.   HENT:  Negative for nasal congestion, ear pain, facial swelling, hearing loss, mouth dryness, mouth sores, postnasal drip, rhinorrhea, sinus pressure/congestion and goiter.    Eyes:  Negative for discharge and itching.   Respiratory:  Negative for cough, shortness of breath and wheezing.    Cardiovascular:  Negative for chest pain and leg swelling.   Gastrointestinal:  Negative for abdominal pain, change in bowel habit and change in bowel habit.   Genitourinary:  Negative for difficulty urinating, dysuria, enuresis, frequency, hematuria and urgency.   Neurological:  Negative for dizziness, vertigo, syncope, weakness and headaches.   Psychiatric/Behavioral:  Negative for decreased concentration.       Medical / Social / Family History     Past Medical  History:   Diagnosis Date    Adult BMI 25.0-25.9 kg/sq m     Anxiety     Arthritis     Calculus of gallbladder and bile duct w/o cholecystitis or obstruction     Carpal tunnel syndrome     Depression     GERD (gastroesophageal reflux disease)     History of traumatic brain injury     Hyperlipidemia     Hypertension     Memory impairment     Minimal cognitive impairment     Rosacea     Scoliosis of lumbar spine     Vitamin D deficiency     Xerosis cutis        Past Surgical History:   Procedure Laterality Date    BRAIN SURGERY      COLONOSCOPY  01/20/2012    HYSTERECTOMY      TONSILLECTOMY         Social History  Ms.  reports that she has quit smoking. She has never used smokeless tobacco. She reports that she does not drink alcohol and does not use drugs.    Family History  Ms.'s family history includes Heart disease in her father; Hypertension in her brother, father, and mother; Thyroid disease in her mother.    Medications and Allergies     Medications  Outpatient Medications Marked as Taking for the 5/15/23 encounter (Office Visit) with Starr Dietz NP   Medication Sig Dispense Refill    calcium carb-D3-mag ox-zinc ox (ALEX MAG ZINC PLUS D3) 333 mg-133 unit -133 mg-5 mg Tab Take 1 tablet by mouth once daily.      [DISCONTINUED] lisinopriL-hydrochlorothiazide (PRINZIDE,ZESTORETIC) 20-12.5 mg per tablet Take 1 tablet by mouth once daily. 90 tablet 1    [DISCONTINUED] meloxicam (MOBIC) 7.5 MG tablet Take 1 tablet (7.5 mg total) by mouth once daily. 90 tablet 1    [DISCONTINUED] paroxetine (PAXIL) 30 MG tablet Take 1 tablet (30 mg total) by mouth every morning. 90 tablet 1    [DISCONTINUED] pravastatin (PRAVACHOL) 40 MG tablet Take 1 tablet (40 mg total) by mouth every evening. 90 tablet 1       Allergies  Review of patient's allergies indicates:   Allergen Reactions    Amoxicillin Itching       Physical Examination     Vitals:    05/15/23 1119   BP: 139/80   BP Location: Left arm   Patient Position: Sitting   BP  "Method: Medium (Automatic)   Pulse: 67   Resp: 20   Temp: 98 °F (36.7 °C)   TempSrc: Oral   SpO2: 95%   Weight: 80.9 kg (178 lb 6 oz)   Height: 5' 7.01" (1.702 m)      Physical Exam  Vitals and nursing note reviewed.   Constitutional:       Appearance: Normal appearance.   HENT:      Head: Normocephalic.      Right Ear: Tympanic membrane, ear canal and external ear normal.      Left Ear: Tympanic membrane, ear canal and external ear normal.      Nose: Nose normal.      Mouth/Throat:      Mouth: Mucous membranes are moist.      Pharynx: Oropharynx is clear.   Eyes:      Extraocular Movements: Extraocular movements intact.      Conjunctiva/sclera: Conjunctivae normal.      Pupils: Pupils are equal, round, and reactive to light.   Cardiovascular:      Rate and Rhythm: Normal rate and regular rhythm.      Pulses: Normal pulses.      Heart sounds: Normal heart sounds.   Pulmonary:      Effort: Pulmonary effort is normal.      Breath sounds: Normal breath sounds.   Abdominal:      General: Bowel sounds are normal.      Palpations: Abdomen is soft.   Musculoskeletal:         General: Normal range of motion.   Skin:     General: Skin is warm and dry.      Capillary Refill: Capillary refill takes less than 2 seconds.   Neurological:      General: No focal deficit present.      Mental Status: She is alert and oriented to person, place, and time.   Psychiatric:         Mood and Affect: Mood normal.         Behavior: Behavior normal.        Assessment and Plan (including Health Maintenance)      Problem List  Smart Sets  Document Outside HM   :    Plan:     Screening for malignant neoplasm of colon  -     Ambulatory referral/consult to Gastroenterology; Future; Expected date: 05/22/2023    Hypertension, unspecified type  -     Microalbumin/Creatinine Ratio, Urine; Future; Expected date: 05/15/2023  -     Comprehensive Metabolic Panel; Future; Expected date: 05/15/2023  -     CBC Auto Differential; Future; Expected date: " 05/15/2023  -     lisinopriL-hydrochlorothiazide (PRINZIDE,ZESTORETIC) 20-12.5 mg per tablet; Take 1 tablet by mouth once daily.  Dispense: 90 tablet; Refill: 2    Hyperlipidemia, unspecified hyperlipidemia type  -     Lipid Panel; Future; Expected date: 05/15/2023  -     pravastatin (PRAVACHOL) 40 MG tablet; Take 1 tablet (40 mg total) by mouth every evening.  Dispense: 90 tablet; Refill: 2    Encounter for screening mammogram for breast cancer  -     Mammo Digital Screening Bilat; Future; Expected date: 05/15/2023    Arthritis  -     meloxicam (MOBIC) 7.5 MG tablet; Take 1 tablet (7.5 mg total) by mouth once daily.  Dispense: 90 tablet; Refill: 2    Depression, unspecified depression type  -     paroxetine (PAXIL) 30 MG tablet; Take 1 tablet (30 mg total) by mouth every morning.  Dispense: 90 tablet; Refill: 2    Primary hypertension    Other orders  -     omeprazole (PRILOSEC) 20 MG capsule; Take 1 capsule (20 mg total) by mouth once daily.  Dispense: 90 capsule; Refill: 2            Health Maintenance Due   Topic Date Due    Hepatitis C Screening  Never done    Hemoglobin A1c (Diabetic Prevention Screening)  Never done    DEXA Scan  Never done    Shingles Vaccine (1 of 2) Never done    Colorectal Cancer Screening  01/20/2022    COVID-19 Vaccine (4 - Booster for Moderna series) 02/22/2022    Mammogram  04/19/2022    Pneumococcal Vaccines (Age 65+) (1 - PCV) Never done       Problem List Items Addressed This Visit          Psychiatric    Depression    Relevant Medications    paroxetine (PAXIL) 30 MG tablet       Cardiac/Vascular    Hyperlipidemia    Current Assessment & Plan     Low fat low chol diet, meds as ordered, exercise daily           Relevant Medications    pravastatin (PRAVACHOL) 40 MG tablet    Other Relevant Orders    Lipid Panel    Hypertension    Current Assessment & Plan     Avoid irritants,such as strong perfumes, pian, cig smoke  Meds as ordered,   Increase fuids,   Return to clinic as needed and  as scheudled           Relevant Medications    lisinopriL-hydrochlorothiazide (PRINZIDE,ZESTORETIC) 20-12.5 mg per tablet    Other Relevant Orders    Microalbumin/Creatinine Ratio, Urine    Comprehensive Metabolic Panel    CBC Auto Differential       Orthopedic    Arthritis    Relevant Medications    meloxicam (MOBIC) 7.5 MG tablet     Other Visit Diagnoses       Screening for malignant neoplasm of colon    -  Primary    Relevant Orders    Ambulatory referral/consult to Gastroenterology    Encounter for screening mammogram for breast cancer        Relevant Orders    Mammo Digital Screening Bilat              Health Maintenance Topics with due status: Not Due       Topic Last Completion Date    TETANUS VACCINE 11/09/2019    Lipid Panel 11/09/2022       Procedures     Future Appointments   Date Time Provider Department Center   8/8/2023 10:30 AM AWV NURSE, [facility] [place] [place]        Follow up in about 6 months (around 11/15/2023).       Signature:  Starr Dietz NP    Date of encounter: 5/15/23

## 2023-05-16 LAB
CREAT UR-MCNC: 145 MG/DL (ref 28–219)
MICROALBUMIN UR-MCNC: 1 MG/DL (ref 0–2.8)
MICROALBUMIN/CREAT RATIO PNL UR: 6.9 MG/G (ref 0–30)

## 2023-05-17 ENCOUNTER — PATIENT OUTREACH (OUTPATIENT)
Dept: ADMINISTRATIVE | Facility: HOSPITAL | Age: 68
End: 2023-05-17

## 2023-05-17 NOTE — PROGRESS NOTES
Health maintenance record review for population health care gaps    Health maintenance reviewed with patient, patient declined health maintenance at this time with documentation from provider noted in chart.           Starr Dietz NP   [facility]  [address] [address] [place]      PATIENT NAME: Lucita Bonilla  : 1955  DATE: 5/15/23  MRN: [id number]       Billing Provider: Starr Dietz NP  Level of Service:   Patient PCP Information         Provider PCP Type     Starr Dietz NP General                Reason for Visit / Chief Complaint: Follow-up (6 month f/u need refills on medication and fasting labs) and Health Maintenance (Hepatitis C Screening Never done  decline/Hemoglobin A1c (Diabetic Prevention Screening) Never done  decline/DEXA Scan Never done  decline/Shingles Vaccine(1 of 2) Never done  decline/Colorectal Cancer Screening due on 2022  referral initiated/COVID-19 Vaccine(4 - Booster for Moderna series) due on 2022 decline/Mammogram due on 2022  referral initiated/Pneumococcal Vaccines (Age 65+)(1 - PCV) Never done  done today)

## 2023-06-14 ENCOUNTER — OFFICE VISIT (OUTPATIENT)
Dept: FAMILY MEDICINE | Facility: CLINIC | Age: 68
End: 2023-06-14
Payer: MEDICARE

## 2023-06-14 VITALS
OXYGEN SATURATION: 95 % | BODY MASS INDEX: 27.53 KG/M2 | RESPIRATION RATE: 18 BRPM | TEMPERATURE: 98 F | DIASTOLIC BLOOD PRESSURE: 76 MMHG | HEART RATE: 90 BPM | HEIGHT: 67 IN | WEIGHT: 175.38 LBS | SYSTOLIC BLOOD PRESSURE: 131 MMHG

## 2023-06-14 DIAGNOSIS — K12.1 MOUTH ULCER: Primary | ICD-10-CM

## 2023-06-14 PROCEDURE — 99213 PR OFFICE/OUTPT VISIT, EST, LEVL III, 20-29 MIN: ICD-10-PCS | Mod: ,,, | Performed by: NURSE PRACTITIONER

## 2023-06-14 PROCEDURE — 99213 OFFICE O/P EST LOW 20 MIN: CPT | Mod: ,,, | Performed by: NURSE PRACTITIONER

## 2023-06-14 NOTE — PROGRESS NOTES
Clinic Note    Lucita Bonilla is a 68 y.o. female     Chief Complaint:   Chief Complaint   Patient presents with    Skin Tags     Under right breast, starting to get red         Subjective:    Patient comes in today with daughter. Daughter reports skin tag to upper abdomen on left side. Admits to area being red with black at the end. Admits to irritation. Chronic and progressively worsened.  Patient also complains of ulcer to mouth. States dentures rub area. Admits to tenderness to ulcer.     Skin Tags  Pertinent negatives include no abdominal pain, chest pain, coughing or fever.      Allergies:   Review of patient's allergies indicates:   Allergen Reactions    Amoxicillin Itching        Past Medical History:  Past Medical History:   Diagnosis Date    Adult BMI 25.0-25.9 kg/sq m     Anxiety     Arthritis     Calculus of gallbladder and bile duct w/o cholecystitis or obstruction     Carpal tunnel syndrome     Depression     GERD (gastroesophageal reflux disease)     History of traumatic brain injury     Hyperlipidemia     Hypertension     Memory impairment     Minimal cognitive impairment     Rosacea     Scoliosis of lumbar spine     Vitamin D deficiency     Xerosis cutis         Current Medications:    Current Outpatient Medications:     calcium carb-D3-mag ox-zinc ox (ALEX MAG ZINC PLUS D3) 333 mg-133 unit -133 mg-5 mg Tab, Take 1 tablet by mouth once daily., Disp: , Rfl:     lisinopriL-hydrochlorothiazide (PRINZIDE,ZESTORETIC) 20-12.5 mg per tablet, Take 1 tablet by mouth once daily., Disp: 90 tablet, Rfl: 2    meloxicam (MOBIC) 7.5 MG tablet, Take 1 tablet (7.5 mg total) by mouth once daily., Disp: 90 tablet, Rfl: 2    omeprazole (PRILOSEC) 20 MG capsule, Take 1 capsule (20 mg total) by mouth once daily., Disp: 90 capsule, Rfl: 2    paroxetine (PAXIL) 30 MG tablet, Take 1 tablet (30 mg total) by mouth every morning., Disp: 90 tablet, Rfl: 2    pravastatin (PRAVACHOL) 40 MG tablet, Take 1 tablet (40 mg total)  "by mouth every evening., Disp: 90 tablet, Rfl: 2    (Magic mouthwash) 1:1:1 diphenhydrAMINE(Benadryl) 12.5mg/5ml liq, aluminum & magnesium hydroxide-simethicone (Maalox), LIDOcaine viscous 2%, Swish and spit 10 mLs every 6 (six) hours as needed. for mouth sores, Disp: 180 mL, Rfl: 0       Review of Systems   Constitutional:  Negative for fever.   Respiratory:  Negative for cough and shortness of breath.    Cardiovascular:  Negative for chest pain.   Gastrointestinal:  Negative for abdominal pain.   Genitourinary:  Negative for dysuria.   Integumentary:  Positive for mole/lesion.        Objective:    /76 (BP Location: Left arm, Patient Position: Sitting, BP Method: Large (Automatic))   Pulse 90   Temp 98.3 °F (36.8 °C)   Resp 18   Ht 5' 7" (1.702 m)   Wt 79.6 kg (175 lb 6.4 oz)   SpO2 95%   BMI 27.47 kg/m²      Physical Exam  Constitutional:       Appearance: Normal appearance.   HENT:      Mouth/Throat:      Mouth: Mucous membranes are moist.        Comments: White ulcer noted to front of bottom gum. Area sore to touch  Eyes:      Extraocular Movements: Extraocular movements intact.   Cardiovascular:      Rate and Rhythm: Normal rate and regular rhythm.      Pulses: Normal pulses.      Heart sounds: Normal heart sounds.   Pulmonary:      Effort: Pulmonary effort is normal.      Breath sounds: Normal breath sounds.   Skin:     Findings: Lesion present.             Comments: Mild erythematous raised area as noted on diagram. Daughter states skin tag has already fallen off.  Small flesh color skin tag noted to left upper abdomen.   Neurological:      Mental Status: She is alert.        Assessment and Plan:    1. Mouth ulcer         Mouth ulcer  -     (Magic mouthwash) 1:1:1 diphenhydrAMINE(Benadryl) 12.5mg/5ml liq, aluminum & magnesium hydroxide-simethicone (Maalox), LIDOcaine viscous 2%; Swish and spit 10 mLs every 6 (six) hours as needed. for mouth sores  Dispense: 180 mL; Refill: 0    -encourage proper " fitting dentures      -skin tag already resolved      There are no Patient Instructions on file for this visit.   Follow up if symptoms worsen or fail to improve.

## 2023-09-09 DIAGNOSIS — Z71.89 COMPLEX CARE COORDINATION: ICD-10-CM

## 2024-10-07 ENCOUNTER — OFFICE VISIT (OUTPATIENT)
Dept: FAMILY MEDICINE | Facility: CLINIC | Age: 69
End: 2024-10-07
Payer: MEDICARE

## 2024-10-07 VITALS
DIASTOLIC BLOOD PRESSURE: 83 MMHG | WEIGHT: 174 LBS | SYSTOLIC BLOOD PRESSURE: 136 MMHG | RESPIRATION RATE: 18 BRPM | HEART RATE: 72 BPM | TEMPERATURE: 98 F | HEIGHT: 67 IN | OXYGEN SATURATION: 97 % | BODY MASS INDEX: 27.31 KG/M2

## 2024-10-07 DIAGNOSIS — I10 PRIMARY HYPERTENSION: ICD-10-CM

## 2024-10-07 DIAGNOSIS — Z12.31 ENCOUNTER FOR SCREENING MAMMOGRAM FOR MALIGNANT NEOPLASM OF BREAST: ICD-10-CM

## 2024-10-07 DIAGNOSIS — Z78.0 POST-MENOPAUSE: ICD-10-CM

## 2024-10-07 DIAGNOSIS — E55.9 VITAMIN D DEFICIENCY: ICD-10-CM

## 2024-10-07 DIAGNOSIS — M19.90 ARTHRITIS: ICD-10-CM

## 2024-10-07 DIAGNOSIS — R05.1 ACUTE COUGH: Primary | ICD-10-CM

## 2024-10-07 DIAGNOSIS — F03.90 DEMENTIA, UNSPECIFIED DEMENTIA SEVERITY, UNSPECIFIED DEMENTIA TYPE, UNSPECIFIED WHETHER BEHAVIORAL, PSYCHOTIC, OR MOOD DISTURBANCE OR ANXIETY: ICD-10-CM

## 2024-10-07 DIAGNOSIS — F32.A DEPRESSION, UNSPECIFIED DEPRESSION TYPE: ICD-10-CM

## 2024-10-07 DIAGNOSIS — E78.5 HYPERLIPIDEMIA, UNSPECIFIED HYPERLIPIDEMIA TYPE: ICD-10-CM

## 2024-10-07 LAB
25(OH)D3 SERPL-MCNC: 28.5 NG/ML
ALBUMIN SERPL BCP-MCNC: 3.7 G/DL (ref 3.5–5)
ALBUMIN/GLOB SERPL: 0.8 {RATIO}
ALP SERPL-CCNC: 140 U/L (ref 55–142)
ALT SERPL W P-5'-P-CCNC: 15 U/L (ref 13–56)
ANION GAP SERPL CALCULATED.3IONS-SCNC: 10 MMOL/L (ref 7–16)
AST SERPL W P-5'-P-CCNC: 27 U/L (ref 15–37)
BASOPHILS # BLD AUTO: 0.05 K/UL (ref 0–0.2)
BASOPHILS NFR BLD AUTO: 0.5 % (ref 0–1)
BILIRUB SERPL-MCNC: 0.4 MG/DL (ref ?–1.2)
BUN SERPL-MCNC: 11 MG/DL (ref 7–18)
BUN/CREAT SERPL: 15 (ref 6–20)
CALCIUM SERPL-MCNC: 9.6 MG/DL (ref 8.5–10.1)
CHLORIDE SERPL-SCNC: 102 MMOL/L (ref 98–107)
CHOLEST SERPL-MCNC: 182 MG/DL (ref 0–200)
CHOLEST/HDLC SERPL: 4.8 {RATIO}
CO2 SERPL-SCNC: 28 MMOL/L (ref 21–32)
CREAT SERPL-MCNC: 0.73 MG/DL (ref 0.55–1.02)
CTP QC/QA: YES
CTP QC/QA: YES
DIFFERENTIAL METHOD BLD: ABNORMAL
EGFR (NO RACE VARIABLE) (RUSH/TITUS): 90 ML/MIN/1.73M2
EOSINOPHIL # BLD AUTO: 0.26 K/UL (ref 0–0.5)
EOSINOPHIL NFR BLD AUTO: 2.7 % (ref 1–4)
ERYTHROCYTE [DISTWIDTH] IN BLOOD BY AUTOMATED COUNT: 13.2 % (ref 11.5–14.5)
GLOBULIN SER-MCNC: 4.5 G/DL (ref 2–4)
GLUCOSE SERPL-MCNC: 87 MG/DL (ref 74–106)
HCT VFR BLD AUTO: 43.2 % (ref 38–47)
HDLC SERPL-MCNC: 38 MG/DL (ref 40–60)
HGB BLD-MCNC: 13.3 G/DL (ref 12–16)
IMM GRANULOCYTES # BLD AUTO: 0.05 K/UL (ref 0–0.04)
IMM GRANULOCYTES NFR BLD: 0.5 % (ref 0–0.4)
LDLC SERPL CALC-MCNC: 123 MG/DL
LDLC/HDLC SERPL: 3.2 {RATIO}
LYMPHOCYTES # BLD AUTO: 2.15 K/UL (ref 1–4.8)
LYMPHOCYTES NFR BLD AUTO: 21.9 % (ref 27–41)
MCH RBC QN AUTO: 28.5 PG (ref 27–31)
MCHC RBC AUTO-ENTMCNC: 30.8 G/DL (ref 32–36)
MCV RBC AUTO: 92.5 FL (ref 80–96)
MONOCYTES # BLD AUTO: 1.24 K/UL (ref 0–0.8)
MONOCYTES NFR BLD AUTO: 12.6 % (ref 2–6)
MPC BLD CALC-MCNC: 9.5 FL (ref 9.4–12.4)
NEUTROPHILS # BLD AUTO: 6.06 K/UL (ref 1.8–7.7)
NEUTROPHILS NFR BLD AUTO: 61.8 % (ref 53–65)
NONHDLC SERPL-MCNC: 144 MG/DL
NRBC # BLD AUTO: 0 X10E3/UL
NRBC, AUTO (.00): 0 %
PLATELET # BLD AUTO: 392 K/UL (ref 150–400)
POC MOLECULAR INFLUENZA A AGN: NEGATIVE
POC MOLECULAR INFLUENZA B AGN: NEGATIVE
POTASSIUM SERPL-SCNC: 4.6 MMOL/L (ref 3.5–5.1)
PROT SERPL-MCNC: 8.2 G/DL (ref 6.4–8.2)
RBC # BLD AUTO: 4.67 M/UL (ref 4.2–5.4)
SARS-COV-2 RDRP RESP QL NAA+PROBE: NEGATIVE
SODIUM SERPL-SCNC: 135 MMOL/L (ref 136–145)
TRIGL SERPL-MCNC: 106 MG/DL (ref 35–150)
VLDLC SERPL-MCNC: 21 MG/DL
WBC # BLD AUTO: 9.81 K/UL (ref 4.5–11)

## 2024-10-07 PROCEDURE — 82306 VITAMIN D 25 HYDROXY: CPT | Mod: ,,, | Performed by: CLINICAL MEDICAL LABORATORY

## 2024-10-07 PROCEDURE — 85025 COMPLETE CBC W/AUTO DIFF WBC: CPT | Mod: ,,, | Performed by: CLINICAL MEDICAL LABORATORY

## 2024-10-07 PROCEDURE — 99214 OFFICE O/P EST MOD 30 MIN: CPT | Mod: ,,, | Performed by: STUDENT IN AN ORGANIZED HEALTH CARE EDUCATION/TRAINING PROGRAM

## 2024-10-07 PROCEDURE — 80061 LIPID PANEL: CPT | Mod: ,,, | Performed by: CLINICAL MEDICAL LABORATORY

## 2024-10-07 PROCEDURE — 87635 SARS-COV-2 COVID-19 AMP PRB: CPT | Mod: RHCUB | Performed by: STUDENT IN AN ORGANIZED HEALTH CARE EDUCATION/TRAINING PROGRAM

## 2024-10-07 PROCEDURE — 80053 COMPREHEN METABOLIC PANEL: CPT | Mod: ,,, | Performed by: CLINICAL MEDICAL LABORATORY

## 2024-10-07 PROCEDURE — 87502 INFLUENZA DNA AMP PROBE: CPT | Mod: RHCUB | Performed by: STUDENT IN AN ORGANIZED HEALTH CARE EDUCATION/TRAINING PROGRAM

## 2024-10-07 RX ORDER — MELOXICAM 7.5 MG/1
7.5 TABLET ORAL DAILY
Qty: 90 TABLET | Refills: 2 | Status: CANCELLED | OUTPATIENT
Start: 2024-10-07

## 2024-10-07 RX ORDER — BENZONATATE 100 MG/1
100 CAPSULE ORAL 3 TIMES DAILY PRN
Qty: 30 CAPSULE | Refills: 0 | Status: SHIPPED | OUTPATIENT
Start: 2024-10-07 | End: 2024-10-17

## 2024-10-07 RX ORDER — AMLODIPINE BESYLATE 5 MG/1
5 TABLET ORAL EVERY MORNING
COMMUNITY
Start: 2024-10-02 | End: 2024-10-07 | Stop reason: SDUPTHER

## 2024-10-07 RX ORDER — DONEPEZIL HYDROCHLORIDE 10 MG/1
10 TABLET, FILM COATED ORAL NIGHTLY
COMMUNITY
Start: 2024-10-02 | End: 2024-10-07 | Stop reason: SDUPTHER

## 2024-10-07 RX ORDER — IPRATROPIUM BROMIDE 21 UG/1
2 SPRAY, METERED NASAL 2 TIMES DAILY
Qty: 30 ML | Refills: 0 | Status: SHIPPED | OUTPATIENT
Start: 2024-10-07

## 2024-10-07 RX ORDER — MEMANTINE HYDROCHLORIDE 10 MG/1
10 TABLET ORAL 2 TIMES DAILY
COMMUNITY
Start: 2024-10-02 | End: 2024-10-07 | Stop reason: SDUPTHER

## 2024-10-07 RX ORDER — HYDROCHLOROTHIAZIDE 12.5 MG/1
12.5 TABLET ORAL EVERY MORNING
COMMUNITY
Start: 2024-10-02 | End: 2024-10-07 | Stop reason: SDUPTHER

## 2024-10-07 RX ORDER — TRAZODONE HYDROCHLORIDE 50 MG/1
50 TABLET ORAL NIGHTLY
COMMUNITY
Start: 2024-10-02 | End: 2024-10-07 | Stop reason: SDUPTHER
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
## 2024-10-07 NOTE — Clinical Note
Will you look at this patient's care gaps. It looks like she had a pap smear uploaded under colonoscopy on 7/19/2023? She is not interested in further pap smears and likely does not need them given age. However, she still qualifies for colonoscopy and if her last colonoscopy was truly in 2012 she needs a repeat.

## 2024-10-08 RX ORDER — HYDROCHLOROTHIAZIDE 12.5 MG/1
12.5 TABLET ORAL EVERY MORNING
Qty: 90 TABLET | Refills: 1 | Status: SHIPPED | OUTPATIENT
Start: 2024-10-08

## 2024-10-08 RX ORDER — AMLODIPINE BESYLATE 5 MG/1
5 TABLET ORAL EVERY MORNING
Qty: 90 TABLET | Refills: 1 | Status: SHIPPED | OUTPATIENT
Start: 2024-10-08

## 2024-10-08 RX ORDER — DONEPEZIL HYDROCHLORIDE 10 MG/1
10 TABLET, FILM COATED ORAL NIGHTLY
Qty: 90 TABLET | Refills: 1 | Status: SHIPPED | OUTPATIENT
Start: 2024-10-08

## 2024-10-08 RX ORDER — MEMANTINE HYDROCHLORIDE 10 MG/1
10 TABLET ORAL 2 TIMES DAILY
Qty: 90 TABLET | Refills: 1 | Status: SHIPPED | OUTPATIENT
Start: 2024-10-08

## 2024-10-08 RX ORDER — TRAZODONE HYDROCHLORIDE 50 MG/1
50 TABLET ORAL NIGHTLY
Qty: 90 TABLET | Refills: 1 | Status: SHIPPED | OUTPATIENT
Start: 2024-10-08

## 2024-10-08 RX ORDER — PAROXETINE 30 MG/1
30 TABLET, FILM COATED ORAL EVERY MORNING
Qty: 90 TABLET | Refills: 1 | Status: SHIPPED | OUTPATIENT
Start: 2024-10-08

## 2024-10-08 RX ORDER — PRAVASTATIN SODIUM 40 MG/1
40 TABLET ORAL NIGHTLY
Qty: 90 TABLET | Refills: 1 | Status: SHIPPED | OUTPATIENT
Start: 2024-10-08

## 2024-10-08 NOTE — PROGRESS NOTES
Please notify patient's brother that her electrolytes, kidney function, and liver enzymes are normal.  Her cholesterol is also well-controlled.  Her blood count is normal.  Her vitamin-D is low.  She will need to start daily vitamin-D 1000 units.  They can get this medication over-the-counter.
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
## 2024-10-09 PROBLEM — M79.601 PAIN OF RIGHT UPPER EXTREMITY: Status: RESOLVED | Noted: 2021-10-22 | Resolved: 2024-10-09

## 2024-10-09 PROBLEM — M25.561 ACUTE PAIN OF RIGHT KNEE: Status: RESOLVED | Noted: 2022-03-01 | Resolved: 2024-10-09

## 2024-10-11 ENCOUNTER — PATIENT OUTREACH (OUTPATIENT)
Facility: HOSPITAL | Age: 69
End: 2024-10-11
Payer: MEDICARE

## 2024-10-11 NOTE — PROGRESS NOTES
Dr. Gilman wanted patient's care gaps to be looked at. It looks like she had a pap smear uploaded under colonoscopy on 7/19/2023? She is not interested in further pap smears and likely does not need them given age. However, she still qualifies for colonoscopy and if her last colonoscopy was truly in 2012 she needs a repeat. I instructed to her that she is correct. Yes you are correct. Someone uploaded pap smear under colonoscopy so her last one was indeed done back in 2012. Not sure how that happened . I will see if I can get that removed for you.    Spoke with Sherin Ram she will put in a ticket.

## 2024-10-12 ENCOUNTER — PATIENT OUTREACH (OUTPATIENT)
Facility: HOSPITAL | Age: 69
End: 2024-10-12
Payer: MEDICARE

## 2024-10-12 NOTE — PROGRESS NOTES
Population Health Chart Review & Patient Outreach Details      Further Action Needed If Patient Returns Outreach:            Updates Requested / Reviewed:     []  Care Everywhere    [x]     []  External Sources ([facility], [facility], DIS, etc.)    [] [facility]   [] [facility]   [] Other:    []  Care Team Updated   []  Removed  or Duplicate Orders   []  Immunization Reconciliation Completed / Queried    [] [place]   [] [place]   [] [place]   [] [place]      Health Maintenance Topics Addressed and Outreach Outcomes / Actions Taken:             Breast Cancer Screening []  Mammogram Order Placed    []  Mammogram Screening Scheduled    []  External Records Requested & Care Team Updated if Applicable    []  External Records Uploaded & Care Team Updated if Applicable    []  Pt Declined Scheduling Mammogram    []  Pt Will Schedule with External Provider / Order Routed & Care Team Updated if Applicable              Cervical Cancer Screening []  Pap Smear Scheduled in Primary Care or OBGYN    []  External Records Requested & Care Team Updated if Applicable       [x]  External Records Uploaded, Care Team Updated, & History Updated if Applicable    []  Patient Declined Scheduling Pap Smear    []  Patient Will Schedule with External Provider & Care Team Updated if Applicable                  Colorectal Cancer Screening []  Colonoscopy Case Request / Referral / Home Test Order Placed    []  External Records Requested & Care Team Updated if Applicable    []  External Records Uploaded, Care Team Updated, & History Updated if Applicable    []  Patient Declined Completing Colon Cancer Screening    []  Patient Will Schedule with External Provider & Care Team Updated if Applicable    []  Fit Kit Mailed (add the SmartPhrase under additional notes)    []  Reminded Patient to Complete Home Test                Diabetic Eye Exam []  Eye Exam Screening Order Placed    []  Eye Camera Scheduled or Optometry/Ophthalmology Referral  Placed    []  External Records Requested & Care Team Updated if Applicable    []  External Records Uploaded, Care Team Updated, & History Updated if Applicable    []  Patient Declined Scheduling Eye Exam    []  Patient Will Schedule with External Provider & Care Team Updated if Applicable             Blood Pressure Control []  Primary Care Follow Up Visit Scheduled     []  Remote Blood Pressure Reading Captured    []  Patient Declined Remote Reading or Scheduling Appt - Escalated to PCP    []  Patient Will Call Back or Send Portal Message with Reading                 HbA1c & Other Labs []  Overdue Lab(s) Ordered    []  Overdue Lab(s) Scheduled    []  External Records Uploaded & Care Team Updated if Applicable    []  Primary Care Follow Up Visit Scheduled     []  Reminded Patient to Complete A1c Home Test    []  Patient Declined Scheduling Labs or Will Call Back to Schedule    []  Patient Will Schedule with External Provider / Order Routed, & Care Team Updated if Applicable           Primary Care Appointment []  Primary Care Appt Scheduled    []  Patient Declined Scheduling or Will Call Back to Schedule    []  Pt Established with External Provider, Updated Care Team, & Informed Pt to Notify Payor if Applicable           Medication Adherence /    Statin Use []  Primary Care Appointment Scheduled    []  Patient Reminded to  Prescription    []  Patient Declined, Provider Notified if Needed    []  Sent Provider Message to Review to Evaluate Pt for Statin, Add Exclusion Dx Codes, Document   Exclusion in Problem List, Change Statin Intensity Level to Moderate or High Intensity if Applicable                Osteoporosis Screening []  Dexa Order Placed    []  Dexa Appointment Scheduled    []  External Records Requested & Care Team Updated    []  External Records Uploaded, Care Team Updated, & History Updated if Applicable    []  Patient Declined Scheduling Dexa or Will Call Back to Schedule    []  Patient Will Schedule  with External Provider / Order Routed & Care Team Updated if Applicable       Additional Notes:

## 2024-10-22 ENCOUNTER — CLINICAL SUPPORT (OUTPATIENT)
Dept: FAMILY MEDICINE | Facility: CLINIC | Age: 69
End: 2024-10-22
Payer: MEDICARE

## 2024-10-22 DIAGNOSIS — Z23 IMMUNIZATION DUE: Primary | ICD-10-CM

## 2024-10-22 PROCEDURE — 90677 PCV20 VACCINE IM: CPT | Mod: ,,, | Performed by: STUDENT IN AN ORGANIZED HEALTH CARE EDUCATION/TRAINING PROGRAM

## 2024-10-22 PROCEDURE — G0008 ADMIN INFLUENZA VIRUS VAC: HCPCS | Mod: ,,, | Performed by: STUDENT IN AN ORGANIZED HEALTH CARE EDUCATION/TRAINING PROGRAM

## 2024-10-22 PROCEDURE — G0009 ADMIN PNEUMOCOCCAL VACCINE: HCPCS | Mod: ,,, | Performed by: STUDENT IN AN ORGANIZED HEALTH CARE EDUCATION/TRAINING PROGRAM

## 2024-10-22 PROCEDURE — 90653 IIV ADJUVANT VACCINE IM: CPT | Mod: ,,, | Performed by: STUDENT IN AN ORGANIZED HEALTH CARE EDUCATION/TRAINING PROGRAM

## 2024-10-22 NOTE — PROGRESS NOTES
Pt is here for immunization encounter for flu immunization and pneumococcal immunization. Advised Dr. Gilman to ensure both vaccines were appropriate for pt to receive the same day. She stated that pt would be okay receiving both vaccines today. Administered 1 mL of fluad immunization in pt's right deltoid. Pt tolerated well. Administered pneumococcal 20 1 mL in pt's left deltoid. Pt tolerated well. No reaction noted after waiting 15 minute shot time. Verbalized understanding.

## 2024-11-05 ENCOUNTER — OFFICE VISIT (OUTPATIENT)
Dept: FAMILY MEDICINE | Facility: CLINIC | Age: 69
End: 2024-11-05
Payer: MEDICARE

## 2024-11-05 VITALS
DIASTOLIC BLOOD PRESSURE: 70 MMHG | OXYGEN SATURATION: 98 % | WEIGHT: 173 LBS | SYSTOLIC BLOOD PRESSURE: 134 MMHG | HEART RATE: 69 BPM | TEMPERATURE: 98 F | RESPIRATION RATE: 16 BRPM | BODY MASS INDEX: 27.15 KG/M2 | HEIGHT: 67 IN

## 2024-11-05 DIAGNOSIS — Z13.1 SCREENING FOR DIABETES MELLITUS: ICD-10-CM

## 2024-11-05 DIAGNOSIS — Z00.00 ENCOUNTER FOR INITIAL ANNUAL WELLNESS VISIT (AWV) IN MEDICARE PATIENT: Primary | ICD-10-CM

## 2024-11-05 DIAGNOSIS — F03.90 DEMENTIA, UNSPECIFIED DEMENTIA SEVERITY, UNSPECIFIED DEMENTIA TYPE, UNSPECIFIED WHETHER BEHAVIORAL, PSYCHOTIC, OR MOOD DISTURBANCE OR ANXIETY: ICD-10-CM

## 2024-11-05 DIAGNOSIS — E78.5 HYPERLIPIDEMIA, UNSPECIFIED HYPERLIPIDEMIA TYPE: ICD-10-CM

## 2024-11-05 DIAGNOSIS — Z87.820 HISTORY OF TRAUMATIC BRAIN INJURY: ICD-10-CM

## 2024-11-05 DIAGNOSIS — Z11.59 ENCOUNTER FOR HEPATITIS C SCREENING TEST FOR LOW RISK PATIENT: ICD-10-CM

## 2024-11-05 DIAGNOSIS — R41.3 MEMORY IMPAIRMENT: ICD-10-CM

## 2024-11-05 DIAGNOSIS — Z78.0 POST-MENOPAUSE: ICD-10-CM

## 2024-11-05 DIAGNOSIS — F32.A DEPRESSION, UNSPECIFIED DEPRESSION TYPE: ICD-10-CM

## 2024-11-05 DIAGNOSIS — H54.62 VISION LOSS OF LEFT EYE: ICD-10-CM

## 2024-11-05 LAB
EST. AVERAGE GLUCOSE BLD GHB EST-MCNC: 114 MG/DL
HBA1C MFR BLD HPLC: 5.6 % (ref 4.5–6.6)
HCV AB SER QL: NORMAL

## 2024-11-05 PROCEDURE — G0438 PPPS, INITIAL VISIT: HCPCS | Mod: ,,, | Performed by: STUDENT IN AN ORGANIZED HEALTH CARE EDUCATION/TRAINING PROGRAM

## 2024-11-05 PROCEDURE — 86803 HEPATITIS C AB TEST: CPT | Mod: ,,, | Performed by: CLINICAL MEDICAL LABORATORY

## 2024-11-05 NOTE — PATIENT INSTRUCTIONS
Counseling and Referral of Other Preventative  (Italic type indicates deductible and co-insurance are waived)    Patient Name: Lucita Bonilla  Today's Date: 11/5/2024    Health Maintenance       Date Due Completion Date    Hepatitis C Screening Never done ---    Hemoglobin A1c (Diabetic Prevention Screening) Never done ---    DEXA Scan Never done ---    Shingles Vaccine (1 of 2) Never done ---    RSV Vaccine (Age 60+ and Pregnant patients) (1 - Risk 60-74 years 1-dose series) Never done ---    Mammogram 04/19/2022 4/19/2021    COVID-19 Vaccine (4 - 2024-25 season) 09/01/2024 12/28/2021    Lipid Panel 10/07/2025 10/7/2024    Override on 2/11/2021: Done    TETANUS VACCINE 11/09/2029 11/9/2019    Colorectal Cancer Screening 07/19/2033 7/19/2023    Override on 1/20/2012: Done        No orders of the defined types were placed in this encounter.    The following information is provided to all patients.  This information is to help you find resources for any of the problems found today that may be affecting your health:                  Living healthy guide: [place].gov    Understanding Diabetes: www.diabetes.org      Eating healthy: www.cdc.gov/healthyweight      CDC home safety checklist: www.cdc.gov/steadi/patient.html      Agency on Aging: [place].gov    Alcoholics anonymous (AA): www.aa.org      Physical Activity: www.kinga.nih.gov/hq2cyhi      Tobacco use: [place].gov

## 2024-11-05 NOTE — PROGRESS NOTES
"  Lucita Bonilla presented for a  Medicare AWV and comprehensive Health Risk Assessment today. The following components were reviewed and updated:    Medical history  Family History  Social history  Allergies and Current Medications  Health Risk Assessment  Health Maintenance  Care Team         ** See Completed Assessments for Annual Wellness Visit within the encounter summary.**         The following assessments were completed:  Living Situation  CAGE  Depression Screening  Timed Get Up and Go  Whisper Test  Cognitive Function Screening  Nutrition Screening  ADL Screening  PAQ Screening          Opioid Documentation    does not have a current opioid prescription.       Vitals:    11/05/24 0815   BP: 134/70   BP Location: Left arm   Patient Position: Sitting   Pulse: 69   Resp: 16   Temp: 97.8 °F (36.6 °C)   SpO2: 98%   Weight: 78.5 kg (173 lb)   Height: 5' 7" (1.702 m)     Body mass index is 27.1 kg/m².  Physical Exam  Vitals and nursing note reviewed.   Constitutional:       General: She is not in acute distress.     Appearance: Normal appearance. She is not ill-appearing, toxic-appearing or diaphoretic.   HENT:      Head: Normocephalic and atraumatic.      Nose: No congestion or rhinorrhea.      Mouth/Throat:      Mouth: Mucous membranes are moist.      Pharynx: No oropharyngeal exudate or posterior oropharyngeal erythema.   Eyes:      Extraocular Movements: Extraocular movements intact.      Pupils: Pupils are equal, round, and reactive to light.   Cardiovascular:      Rate and Rhythm: Normal rate and regular rhythm.      Heart sounds: No murmur heard.     No friction rub. No gallop.   Pulmonary:      Effort: Pulmonary effort is normal. No respiratory distress.      Breath sounds: Normal breath sounds. No wheezing, rhonchi or rales.   Abdominal:      Palpations: Abdomen is soft. There is no mass.      Tenderness: There is no abdominal tenderness. There is no guarding.   Musculoskeletal:         General: Normal " range of motion.   Skin:     General: Skin is warm and dry.      Capillary Refill: Capillary refill takes less than 2 seconds.   Neurological:      General: No focal deficit present.      Mental Status: She is alert.   Psychiatric:         Mood and Affect: Mood normal.         Behavior: Behavior normal.             Diagnoses and health risks identified today and associated recommendations/orders:    Problem List Items Addressed This Visit       Depression    History of traumatic brain injury    Hyperlipidemia    Memory impairment     Other Visit Diagnoses       Encounter for initial annual wellness visit (AWV) in Medicare patient    -  Primary    Dementia, unspecified dementia severity, unspecified dementia type, unspecified whether behavioral, psychotic, or mood disturbance or anxiety        Post-menopause        Vision loss of left eye        Encounter for hepatitis C screening test for low risk patient        Relevant Orders    Hepatitis C Antibody    BMI 27.0-27.9,adult        Screening for diabetes mellitus        Relevant Orders    Hemoglobin A1C            Provided Lucita with a 5-10 year written screening schedule and personal prevention plan. Recommendations were developed using the USPSTF age appropriate recommendations. Education, counseling, and referrals were provided as needed. After Visit Summary printed and given to patient which includes a list of additional screenings\tests needed.    Patient declines vaccines today , will follow up with pharmacy at later date if desired.     A1c and hep c ordered today .     Mammo and bone density scheduled for 11/19/2024    Follow up for 1 year annual wellness.    SAHARA MIRANDA MD      I offered to discuss advanced care planning, including how to pick a person who would make decisions for you if you were unable to make them for yourself, called a health care power of , and what kind of decisions you might make such as use of life sustaining  treatments such as ventilators and tube feeding when faced with a life limiting illness recorded on a living will that they will need to know. (How you want to be cared for as you near the end of your natural life)     X Patient is interested in learning more about how to make advanced directives.  I provided them paperwork and offered to discuss this with them.

## 2024-11-19 ENCOUNTER — HOSPITAL ENCOUNTER (OUTPATIENT)
Dept: RADIOLOGY | Facility: HOSPITAL | Age: 69
Discharge: HOME | End: 2024-11-19
Attending: STUDENT IN AN ORGANIZED HEALTH CARE EDUCATION/TRAINING PROGRAM
Payer: MEDICARE

## 2024-11-19 VITALS — BODY MASS INDEX: 28.66 KG/M2 | WEIGHT: 172 LBS | HEIGHT: 65 IN

## 2024-11-19 DIAGNOSIS — Z12.31 ENCOUNTER FOR SCREENING MAMMOGRAM FOR MALIGNANT NEOPLASM OF BREAST: ICD-10-CM

## 2024-11-19 DIAGNOSIS — Z78.0 POST-MENOPAUSE: ICD-10-CM

## 2024-11-19 PROCEDURE — 77067 SCR MAMMO BI INCL CAD: CPT | Mod: TC

## 2024-11-19 PROCEDURE — 77080 DXA BONE DENSITY AXIAL: CPT | Mod: 26,,, | Performed by: RADIOLOGY

## 2024-11-19 PROCEDURE — 77080 DXA BONE DENSITY AXIAL: CPT | Mod: TC

## 2024-12-05 ENCOUNTER — TELEPHONE (OUTPATIENT)
Dept: FAMILY MEDICINE | Facility: CLINIC | Age: 69
End: 2024-12-05
Payer: MEDICARE

## 2024-12-05 DIAGNOSIS — M85.80 OSTEOPENIA, UNSPECIFIED LOCATION: Primary | ICD-10-CM

## 2024-12-05 RX ORDER — ALENDRONATE SODIUM 70 MG/1
70 TABLET ORAL
Qty: 12 TABLET | Refills: 1 | Status: SHIPPED | OUTPATIENT
Start: 2024-12-05 | End: 2025-12-05

## 2024-12-05 NOTE — TELEPHONE ENCOUNTER
----- Message from LIDIA Escamilla sent at 12/5/2024  3:52 PM CST -----  Bone density indicates osteopenia. Sent in fosamax for her to start weekly.

## 2025-04-01 ENCOUNTER — OFFICE VISIT (OUTPATIENT)
Dept: NEUROLOGY | Facility: CLINIC | Age: 70
End: 2025-04-01
Payer: MEDICARE

## 2025-04-01 VITALS — BODY MASS INDEX: 28.82 KG/M2 | WEIGHT: 173 LBS | HEIGHT: 65 IN

## 2025-04-01 DIAGNOSIS — R41.3 OTHER AMNESIA: ICD-10-CM

## 2025-04-01 DIAGNOSIS — R41.3 MEMORY IMPAIRMENT: Primary | ICD-10-CM

## 2025-04-01 DIAGNOSIS — F41.9 ANXIETY: ICD-10-CM

## 2025-04-01 DIAGNOSIS — F03.90 DEMENTIA, UNSPECIFIED DEMENTIA SEVERITY, UNSPECIFIED DEMENTIA TYPE, UNSPECIFIED WHETHER BEHAVIORAL, PSYCHOTIC, OR MOOD DISTURBANCE OR ANXIETY: ICD-10-CM

## 2025-04-01 DIAGNOSIS — F32.A DEPRESSION, UNSPECIFIED DEPRESSION TYPE: ICD-10-CM

## 2025-04-01 DIAGNOSIS — Z87.820 HISTORY OF TRAUMATIC BRAIN INJURY: ICD-10-CM

## 2025-04-01 PROCEDURE — 99215 OFFICE O/P EST HI 40 MIN: CPT | Mod: PBBFAC | Performed by: PSYCHIATRY & NEUROLOGY

## 2025-04-01 PROCEDURE — 99214 OFFICE O/P EST MOD 30 MIN: CPT | Mod: S$PBB,,, | Performed by: PSYCHIATRY & NEUROLOGY

## 2025-04-01 PROCEDURE — 99999 PR PBB SHADOW E&M-EST. PATIENT-LVL V: CPT | Mod: PBBFAC,,, | Performed by: PSYCHIATRY & NEUROLOGY
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
## 2025-04-01 NOTE — PATIENT INSTRUCTIONS
MRI brain with contrast in [place] [facility]   Continue current memory aids Aricept and Namenda  Recommend behavioral health evaluation and psychological counseling for significant anxiety/depression and TRAUMATIC BRAIN INJURY  Control risk factor  Increase daily physical activity and improve sleep habits  Recommend  continued group socialization and finding a daily pleasurable activity to improve mood and function  Follow-up three-months

## 2025-04-09 ENCOUNTER — HOSPITAL ENCOUNTER (OUTPATIENT)
Dept: RADIOLOGY | Facility: HOSPITAL | Age: 70
Discharge: HOME | End: 2025-04-09
Attending: PSYCHIATRY & NEUROLOGY
Payer: MEDICARE

## 2025-04-09 ENCOUNTER — OFFICE VISIT (OUTPATIENT)
Dept: FAMILY MEDICINE | Facility: CLINIC | Age: 70
End: 2025-04-09
Payer: MEDICARE

## 2025-04-09 VITALS
OXYGEN SATURATION: 97 % | DIASTOLIC BLOOD PRESSURE: 80 MMHG | RESPIRATION RATE: 18 BRPM | SYSTOLIC BLOOD PRESSURE: 130 MMHG | HEART RATE: 79 BPM | HEIGHT: 65 IN | WEIGHT: 162.69 LBS | BODY MASS INDEX: 27.1 KG/M2 | TEMPERATURE: 98 F

## 2025-04-09 DIAGNOSIS — F32.A DEPRESSION, UNSPECIFIED DEPRESSION TYPE: ICD-10-CM

## 2025-04-09 DIAGNOSIS — I10 PRIMARY HYPERTENSION: Primary | ICD-10-CM

## 2025-04-09 DIAGNOSIS — M85.80 OSTEOPENIA, UNSPECIFIED LOCATION: ICD-10-CM

## 2025-04-09 DIAGNOSIS — F03.90 DEMENTIA, UNSPECIFIED DEMENTIA SEVERITY, UNSPECIFIED DEMENTIA TYPE, UNSPECIFIED WHETHER BEHAVIORAL, PSYCHOTIC, OR MOOD DISTURBANCE OR ANXIETY: ICD-10-CM

## 2025-04-09 DIAGNOSIS — R41.3 OTHER AMNESIA: ICD-10-CM

## 2025-04-09 DIAGNOSIS — E78.5 HYPERLIPIDEMIA, UNSPECIFIED HYPERLIPIDEMIA TYPE: ICD-10-CM

## 2025-04-09 PROCEDURE — 99214 OFFICE O/P EST MOD 30 MIN: CPT | Mod: ,,, | Performed by: STUDENT IN AN ORGANIZED HEALTH CARE EDUCATION/TRAINING PROGRAM

## 2025-04-09 PROCEDURE — 25500027 PHARM REV CODE 255 ALT 636 MCARE W HCPCS: Performed by: PSYCHIATRY & NEUROLOGY

## 2025-04-09 PROCEDURE — 70553 MRI BRAIN STEM W/O & W/DYE: CPT | Mod: 26,,, | Performed by: RADIOLOGY

## 2025-04-09 PROCEDURE — 70553 MRI BRAIN STEM W/O & W/DYE: CPT | Mod: TC

## 2025-04-09 PROCEDURE — A9575 INJ GADOTERATE MEGLUMI 0.1ML: HCPCS | Performed by: PSYCHIATRY & NEUROLOGY

## 2025-04-09 RX ORDER — ALENDRONATE SODIUM 70 MG/1
70 TABLET ORAL
Qty: 12 TABLET | Refills: 1 | Status: SHIPPED | OUTPATIENT
Start: 2025-04-09 | End: 2026-04-09

## 2025-04-09 RX ORDER — PAROXETINE 30 MG/1
30 TABLET, FILM COATED ORAL EVERY MORNING
Qty: 90 TABLET | Refills: 1 | Status: SHIPPED | OUTPATIENT
Start: 2025-04-09

## 2025-04-09 RX ORDER — PRAVASTATIN SODIUM 40 MG/1
40 TABLET ORAL NIGHTLY
Qty: 90 TABLET | Refills: 1 | Status: SHIPPED | OUTPATIENT
Start: 2025-04-09

## 2025-04-09 RX ORDER — HYDROCHLOROTHIAZIDE 12.5 MG/1
12.5 TABLET ORAL EVERY MORNING
Qty: 90 TABLET | Refills: 1 | Status: SHIPPED | OUTPATIENT
Start: 2025-04-09

## 2025-04-09 RX ORDER — TRAZODONE HYDROCHLORIDE 50 MG/1
50 TABLET ORAL NIGHTLY
Qty: 90 TABLET | Refills: 1 | Status: SHIPPED | OUTPATIENT
Start: 2025-04-09

## 2025-04-09 RX ORDER — DONEPEZIL HYDROCHLORIDE 10 MG/1
10 TABLET, FILM COATED ORAL NIGHTLY
Qty: 90 TABLET | Refills: 1 | Status: SHIPPED | OUTPATIENT
Start: 2025-04-09

## 2025-04-09 RX ORDER — AMLODIPINE BESYLATE 5 MG/1
5 TABLET ORAL EVERY MORNING
Qty: 90 TABLET | Refills: 1 | Status: SHIPPED | OUTPATIENT
Start: 2025-04-09

## 2025-04-09 RX ORDER — MEMANTINE HYDROCHLORIDE 10 MG/1
10 TABLET ORAL 2 TIMES DAILY
Qty: 90 TABLET | Refills: 1 | Status: SHIPPED | OUTPATIENT
Start: 2025-04-09

## 2025-04-09 RX ADMIN — GADOTERATE MEGLUMINE 15 ML: 376.9 INJECTION INTRAVENOUS at 03:04

## 2025-04-09 NOTE — PROGRESS NOTES
Progress Note     SAHARA MIRANDA MD   [address]  [place] [address]     PATIENT NAME: Lucita Bonilla  : 1955  DATE: 25  MRN: [id number]      Billing Provider: SAHARA MIRANDA MD  Level of Service:   Patient PCP Information       Provider PCP Type    SAHARA MIRANDA MD General                Follow-up (Pt presents to clinic for follow up on chronic health issues. She is fasting for lab this morning. ) and Health Maintenance (Pt declines all vaccines at his time. )      SUBJECTIVE:     Lucita Bonilla is a 69 y.o.female who presents to clinic for Follow-up (Pt presents to clinic for follow up on chronic health issues. She is fasting for lab this morning. ) and Health Maintenance (Pt declines all vaccines at his time. )    Patient presents to clinic today for medication refills and follow up.    Patient is doing well.  She has a history of hypertension which is currently well-controlled with hydrochlorothiazide and amlodipine.  She is tolerating these medications without difficulty.      Patient also has a history of depression which is currently well-controlled on Paxil and trazodone.  Patient is tolerating these medications without difficulty as well.    Patient has a history of hyperlipidemia and is on pravastatin.  Patient is due for lipid panel.  Patient is tolerating the medication without difficulty.    Patient also has a history of osteopenia is currently prescribed Fosamax.  Patient doing well with this regimen.  Patient will be due for DEXA scan in 1-2 years.    Patient also has memory impairment.  Patient was evaluated by Neurology.  Patient was counseled to continue Aricept and Namenda.  Neurology felt her depression is likely contributing to her symptoms along with her history of traumatic brain injury.  Patient's sister-in-law believes her depression is currently well-controlled with her regimen.  Patient does have MRI  "scheduled for later today.    All other pertinent review of systems negative. Please see HPI for details.     Past Medical History:  has a past medical history of Adult BMI 25.0-25.9 kg/sq m, Anxiety, Arthritis, Calculus of gallbladder and bile duct w/o cholecystitis or obstruction, Carpal tunnel syndrome, Depression, GERD (gastroesophageal reflux disease), History of traumatic brain injury, Hyperlipidemia, Hypertension, Memory impairment, Minimal cognitive impairment, Rosacea, Scoliosis of lumbar spine, Vitamin D deficiency, and Xerosis cutis.   Past Surgical History:  has a past surgical history that includes Brain surgery; Colonoscopy (01/20/2012); Hysterectomy; and Tonsillectomy.  Family History: family history includes Heart disease in her father; Hypertension in her brother, father, and mother; Scleroderma in her mother; Thyroid disease in her mother.  Social History:  reports that she has quit smoking. She has never used smokeless tobacco. She reports that she does not drink alcohol and does not use drugs.  Allergies:   Review of patient's allergies indicates:   Allergen Reactions    Amoxicillin Itching       Current Medications[1]   OBJECTIVE:     Vital Signs   /80 (BP Location: Left arm, Patient Position: Sitting)   Pulse 79   Temp 98.1 °F (36.7 °C) (Oral)   Resp 18   Ht 5' 5" (1.651 m)   Wt 73.8 kg (162 lb 11.2 oz)   SpO2 97%   BMI 27.07 kg/m²     Physical Exam  Constitutional:       General: She is not in acute distress.     Appearance: Normal appearance. She is not ill-appearing, toxic-appearing or diaphoretic.   HENT:      Head: Normocephalic and atraumatic.      Mouth/Throat:      Mouth: Mucous membranes are moist.   Eyes:      Extraocular Movements: Extraocular movements intact.      Pupils: Pupils are equal, round, and reactive to light.   Cardiovascular:      Rate and Rhythm: Normal rate and regular rhythm.      Pulses: Normal pulses.      Heart sounds: Normal heart sounds. No murmur " heard.     No friction rub. No gallop.   Pulmonary:      Effort: Pulmonary effort is normal. No respiratory distress.      Breath sounds: No wheezing, rhonchi or rales.   Abdominal:      General: Abdomen is flat.      Palpations: Abdomen is soft.      Tenderness: There is no abdominal tenderness. There is no guarding or rebound.   Musculoskeletal:         General: Normal range of motion.      Cervical back: Normal range of motion.   Skin:     General: Skin is warm and dry.      Capillary Refill: Capillary refill takes less than 2 seconds.   Neurological:      General: No focal deficit present.      Mental Status: She is alert.   Psychiatric:         Mood and Affect: Mood normal.         Behavior: Behavior normal.         ASSESSMENT/PLAN:     1. Primary hypertension  -     amLODIPine (NORVASC) 5 MG tablet; Take 1 tablet (5 mg total) by mouth every morning.  Dispense: 90 tablet; Refill: 1  -     hydroCHLOROthiazide 12.5 MG Tab; Take 1 tablet (12.5 mg total) by mouth every morning.  Dispense: 90 tablet; Refill: 1  -     Basic Metabolic Panel; Future; Expected date: 04/09/2025  -     TSH; Future; Expected date: 04/09/2025  Currently well-controlled.  Continue current regimen.  Obtaining blood work for routine monitoring.      2. Osteopenia, unspecified location  -     alendronate (FOSAMAX) 70 MG tablet; Take 1 tablet (70 mg total) by mouth every 7 days.  Dispense: 12 tablet; Refill: 1  Patient tolerating without difficulty.  Refill provided.  Patient due for repeat bone density scan in November 2026.    3. Hyperlipidemia, unspecified hyperlipidemia type  -     pravastatin (PRAVACHOL) 40 MG tablet; Take 1 tablet (40 mg total) by mouth every evening.  Dispense: 90 tablet; Refill: 1  Currently well-controlled.  Patient to continue.  Refill provided.    4. Depression, unspecified depression type  -     paroxetine (PAXIL) 30 MG tablet; Take 1 tablet (30 mg total) by mouth every morning.  Dispense: 90 tablet; Refill:  1  Currently well-controlled.  Patient to continue Paxil.  Refill provided.      5. Dementia, unspecified dementia severity, unspecified dementia type, unspecified whether behavioral, psychotic, or mood disturbance or anxiety  -     donepeziL (ARICEPT) 10 MG tablet; Take 1 tablet (10 mg total) by mouth every evening.  Dispense: 90 tablet; Refill: 1  -     memantine (NAMENDA) 10 MG Tab; Take 1 tablet (10 mg total) by mouth 2 (two) times daily.  Dispense: 90 tablet; Refill: 1  -     TSH; Future; Expected date: 04/09/2025  Patient followed by Neurology.  Patient to continue Aricept and Namenda.  Obtaining TSH as well.      Follow up in about 6 months (around 10/9/2025).      SAHARA MIRANDA MD  04/09/2025    Due to voice recognition software, sound alike and misspelled words may be contained in the documentation.              [1]   Current Outpatient Medications:     calcium carb-D3-mag ox-zinc ox (ALEX MAG ZINC PLUS D3) 333 mg-133 unit -133 mg-5 mg Tab, Take 1 tablet by mouth once daily., Disp: , Rfl:     alendronate (FOSAMAX) 70 MG tablet, Take 1 tablet (70 mg total) by mouth every 7 days., Disp: 12 tablet, Rfl: 1    amLODIPine (NORVASC) 5 MG tablet, Take 1 tablet (5 mg total) by mouth every morning., Disp: 90 tablet, Rfl: 1    donepeziL (ARICEPT) 10 MG tablet, Take 1 tablet (10 mg total) by mouth every evening., Disp: 90 tablet, Rfl: 1    hydroCHLOROthiazide 12.5 MG Tab, Take 1 tablet (12.5 mg total) by mouth every morning., Disp: 90 tablet, Rfl: 1    memantine (NAMENDA) 10 MG Tab, Take 1 tablet (10 mg total) by mouth 2 (two) times daily., Disp: 90 tablet, Rfl: 1    omeprazole (PRILOSEC) 20 MG capsule, Take 1 capsule (20 mg total) by mouth once daily. (Patient not taking: Reported on 4/9/2025), Disp: 90 capsule, Rfl: 2    paroxetine (PAXIL) 30 MG tablet, Take 1 tablet (30 mg total) by mouth every morning., Disp: 90 tablet, Rfl: 1    pravastatin (PRAVACHOL) 40 MG tablet, Take 1 tablet (40 mg total) by mouth  every evening., Disp: 90 tablet, Rfl: 1    traZODone (DESYREL) 50 MG tablet, Take 1 tablet (50 mg total) by mouth every evening., Disp: 90 tablet, Rfl: 1

## 2025-04-10 ENCOUNTER — RESULTS FOLLOW-UP (OUTPATIENT)
Dept: FAMILY MEDICINE | Facility: CLINIC | Age: 70
End: 2025-04-10

## 2025-04-10 NOTE — PROGRESS NOTES
Please let patient's brother or sister-in-law know that her electrolytes and kidney function are normal.  Her thyroid level is also normal.

## 2025-04-10 NOTE — PROGRESS NOTES
Contacted pt's brother and discussed results.   Verbalized understanding.   No questions or concerns stated.

## 2025-08-05 ENCOUNTER — TELEPHONE (OUTPATIENT)
Dept: RADIOLOGY | Facility: CLINIC | Age: 70
End: 2025-08-05
Payer: MEDICARE

## 2025-08-05 DIAGNOSIS — F03.90 DEMENTIA, UNSPECIFIED DEMENTIA SEVERITY, UNSPECIFIED DEMENTIA TYPE, UNSPECIFIED WHETHER BEHAVIORAL, PSYCHOTIC, OR MOOD DISTURBANCE OR ANXIETY: ICD-10-CM

## 2025-08-05 RX ORDER — MEMANTINE HYDROCHLORIDE 10 MG/1
10 TABLET ORAL 2 TIMES DAILY
Qty: 90 TABLET | Refills: 1 | Status: SHIPPED | OUTPATIENT
Start: 2025-08-05

## 2025-08-29 ENCOUNTER — OFFICE VISIT (OUTPATIENT)
Dept: FAMILY MEDICINE | Facility: CLINIC | Age: 70
End: 2025-08-29
Payer: MEDICARE

## 2025-08-29 ENCOUNTER — HOSPITAL ENCOUNTER (OUTPATIENT)
Dept: RADIOLOGY | Facility: HOSPITAL | Age: 70
Discharge: HOME | End: 2025-08-29
Attending: STUDENT IN AN ORGANIZED HEALTH CARE EDUCATION/TRAINING PROGRAM
Payer: MEDICARE

## 2025-08-29 VITALS
OXYGEN SATURATION: 98 % | HEART RATE: 78 BPM | WEIGHT: 162 LBS | DIASTOLIC BLOOD PRESSURE: 100 MMHG | HEIGHT: 65 IN | RESPIRATION RATE: 18 BRPM | TEMPERATURE: 98 F | SYSTOLIC BLOOD PRESSURE: 140 MMHG | BODY MASS INDEX: 26.99 KG/M2

## 2025-08-29 DIAGNOSIS — M85.80 OSTEOPENIA, UNSPECIFIED LOCATION: ICD-10-CM

## 2025-08-29 DIAGNOSIS — G30.9 ALZHEIMER'S DEMENTIA, UNSPECIFIED DEMENTIA SEVERITY, UNSPECIFIED TIMING OF DEMENTIA ONSET, UNSPECIFIED WHETHER BEHAVIORAL, PSYCHOTIC, OR MOOD DISTURBANCE OR ANXIETY: Primary | ICD-10-CM

## 2025-08-29 DIAGNOSIS — E78.5 HYPERLIPIDEMIA, UNSPECIFIED HYPERLIPIDEMIA TYPE: ICD-10-CM

## 2025-08-29 DIAGNOSIS — I10 PRIMARY HYPERTENSION: ICD-10-CM

## 2025-08-29 DIAGNOSIS — F33.42 RECURRENT MAJOR DEPRESSIVE DISORDER, IN FULL REMISSION: ICD-10-CM

## 2025-08-29 DIAGNOSIS — Z11.1 SCREENING-PULMONARY TB: ICD-10-CM

## 2025-08-29 DIAGNOSIS — F02.80 ALZHEIMER'S DEMENTIA, UNSPECIFIED DEMENTIA SEVERITY, UNSPECIFIED TIMING OF DEMENTIA ONSET, UNSPECIFIED WHETHER BEHAVIORAL, PSYCHOTIC, OR MOOD DISTURBANCE OR ANXIETY: Primary | ICD-10-CM

## 2025-08-29 DIAGNOSIS — Z87.820 HISTORY OF TRAUMATIC BRAIN INJURY: ICD-10-CM

## 2025-08-29 DIAGNOSIS — Z12.31 SCREENING MAMMOGRAM FOR BREAST CANCER: ICD-10-CM

## 2025-08-29 PROBLEM — Z23 ENCOUNTER FOR IMMUNIZATION: Status: RESOLVED | Noted: 2022-11-09 | Resolved: 2025-08-29
